# Patient Record
Sex: FEMALE | Race: OTHER | Employment: OTHER | URBAN - METROPOLITAN AREA
[De-identification: names, ages, dates, MRNs, and addresses within clinical notes are randomized per-mention and may not be internally consistent; named-entity substitution may affect disease eponyms.]

---

## 2019-04-15 PROBLEM — N18.30 CKD (CHRONIC KIDNEY DISEASE) STAGE 3, GFR 30-59 ML/MIN (HCC): Status: ACTIVE | Noted: 2019-04-15

## 2019-06-20 ENCOUNTER — HOSPITAL ENCOUNTER (OUTPATIENT)
Dept: MAMMOGRAPHY | Age: 74
Discharge: HOME | End: 2019-06-20
Attending: FAMILY MEDICINE
Payer: MEDICARE

## 2019-06-20 DIAGNOSIS — Z12.31 SCREENING MAMMOGRAM, ENCOUNTER FOR: ICD-10-CM

## 2019-06-20 DIAGNOSIS — Z13.820 SPECIAL SCREENING FOR OSTEOPOROSIS: ICD-10-CM

## 2019-06-20 PROCEDURE — 77067 SCR MAMMO BI INCL CAD: CPT

## 2019-06-20 PROCEDURE — 77080 DXA BONE DENSITY AXIAL: CPT

## 2020-06-22 ENCOUNTER — HOSPITAL ENCOUNTER (OUTPATIENT)
Dept: MAMMOGRAPHY | Age: 75
Discharge: HOME | End: 2020-06-22
Attending: FAMILY MEDICINE

## 2020-06-22 DIAGNOSIS — Z12.31 SCREENING MAMMOGRAM, ENCOUNTER FOR: ICD-10-CM

## 2021-12-27 PROBLEM — F11.99 OPIOID USE, UNSPECIFIED WITH UNSPECIFIED OPIOID-INDUCED DISORDER (HCC): Status: ACTIVE | Noted: 2021-12-27

## 2022-03-18 PROBLEM — N18.30 CKD (CHRONIC KIDNEY DISEASE) STAGE 3, GFR 30-59 ML/MIN (HCC): Status: ACTIVE | Noted: 2019-04-15

## 2022-03-18 PROBLEM — F11.99 OPIOID USE, UNSPECIFIED WITH UNSPECIFIED OPIOID-INDUCED DISORDER (HCC): Status: ACTIVE | Noted: 2021-12-27

## 2022-06-24 RX ORDER — OMEPRAZOLE 20 MG/1
CAPSULE, DELAYED RELEASE ORAL
Qty: 90 CAPSULE | Refills: 3 | Status: SHIPPED | OUTPATIENT
Start: 2022-06-24 | End: 2022-07-20 | Stop reason: SDUPTHER

## 2022-06-24 RX ORDER — TRAZODONE HYDROCHLORIDE 50 MG/1
TABLET ORAL
Qty: 90 TABLET | Refills: 3 | Status: SHIPPED | OUTPATIENT
Start: 2022-06-24 | End: 2022-07-20 | Stop reason: SDUPTHER

## 2022-06-24 RX ORDER — SERTRALINE HYDROCHLORIDE 100 MG/1
TABLET, FILM COATED ORAL
Qty: 90 TABLET | Refills: 3 | Status: SHIPPED | OUTPATIENT
Start: 2022-06-24 | End: 2022-07-20 | Stop reason: SDUPTHER

## 2022-06-24 RX ORDER — ATORVASTATIN CALCIUM 40 MG/1
TABLET, FILM COATED ORAL
Qty: 90 TABLET | Refills: 3 | Status: SHIPPED | OUTPATIENT
Start: 2022-06-24 | End: 2022-07-20 | Stop reason: SDUPTHER

## 2022-07-12 DIAGNOSIS — E55.9 VITAMIN D DEFICIENCY: ICD-10-CM

## 2022-07-12 DIAGNOSIS — Z00.00 MEDICARE ANNUAL WELLNESS VISIT, SUBSEQUENT: ICD-10-CM

## 2022-07-12 DIAGNOSIS — I10 PRIMARY HYPERTENSION: Primary | ICD-10-CM

## 2022-07-12 DIAGNOSIS — E07.9 THYROID DISEASE: ICD-10-CM

## 2022-07-12 DIAGNOSIS — E78.00 HYPERCHOLESTEROLEMIA: ICD-10-CM

## 2022-07-13 ENCOUNTER — NURSE ONLY (OUTPATIENT)
Dept: FAMILY MEDICINE CLINIC | Facility: CLINIC | Age: 77
End: 2022-07-13

## 2022-07-13 DIAGNOSIS — E07.9 THYROID DISEASE: ICD-10-CM

## 2022-07-13 DIAGNOSIS — E78.00 HYPERCHOLESTEROLEMIA: ICD-10-CM

## 2022-07-13 DIAGNOSIS — Z00.00 MEDICARE ANNUAL WELLNESS VISIT, SUBSEQUENT: ICD-10-CM

## 2022-07-13 DIAGNOSIS — E55.9 VITAMIN D DEFICIENCY: ICD-10-CM

## 2022-07-13 DIAGNOSIS — I10 PRIMARY HYPERTENSION: ICD-10-CM

## 2022-07-13 LAB
25(OH)D3 SERPL-MCNC: 31.3 NG/ML (ref 30–100)
ALBUMIN SERPL-MCNC: 3.9 G/DL (ref 3.2–4.6)
ALBUMIN/GLOB SERPL: 1 {RATIO} (ref 1.2–3.5)
ALP SERPL-CCNC: 148 U/L (ref 50–136)
ALT SERPL-CCNC: 26 U/L (ref 12–65)
ANION GAP SERPL CALC-SCNC: 3 MMOL/L (ref 7–16)
AST SERPL-CCNC: 27 U/L (ref 15–37)
BASOPHILS # BLD: 0.1 K/UL (ref 0–0.2)
BASOPHILS NFR BLD: 1 % (ref 0–2)
BILIRUB SERPL-MCNC: 0.5 MG/DL (ref 0.2–1.1)
BUN SERPL-MCNC: 31 MG/DL (ref 8–23)
CALCIUM SERPL-MCNC: 9.6 MG/DL (ref 8.3–10.4)
CHLORIDE SERPL-SCNC: 104 MMOL/L (ref 98–107)
CHOLEST SERPL-MCNC: 192 MG/DL
CO2 SERPL-SCNC: 31 MMOL/L (ref 21–32)
CREAT SERPL-MCNC: 1.4 MG/DL (ref 0.6–1)
DIFFERENTIAL METHOD BLD: NORMAL
EOSINOPHIL # BLD: 0.2 K/UL (ref 0–0.8)
EOSINOPHIL NFR BLD: 4 % (ref 0.5–7.8)
ERYTHROCYTE [DISTWIDTH] IN BLOOD BY AUTOMATED COUNT: 13.2 % (ref 11.9–14.6)
GLOBULIN SER CALC-MCNC: 3.9 G/DL (ref 2.3–3.5)
GLUCOSE SERPL-MCNC: 73 MG/DL (ref 65–100)
HCT VFR BLD AUTO: 40.2 % (ref 35.8–46.3)
HDLC SERPL-MCNC: 82 MG/DL (ref 40–60)
HDLC SERPL: 2.3 {RATIO}
HGB BLD-MCNC: 13 G/DL (ref 11.7–15.4)
IMM GRANULOCYTES # BLD AUTO: 0 K/UL (ref 0–0.5)
IMM GRANULOCYTES NFR BLD AUTO: 0 % (ref 0–5)
LDLC SERPL CALC-MCNC: 90.2 MG/DL
LYMPHOCYTES # BLD: 1.9 K/UL (ref 0.5–4.6)
LYMPHOCYTES NFR BLD: 30 % (ref 13–44)
MCH RBC QN AUTO: 30.5 PG (ref 26.1–32.9)
MCHC RBC AUTO-ENTMCNC: 32.3 G/DL (ref 31.4–35)
MCV RBC AUTO: 94.4 FL (ref 79.6–97.8)
MONOCYTES # BLD: 0.5 K/UL (ref 0.1–1.3)
MONOCYTES NFR BLD: 9 % (ref 4–12)
NEUTS SEG # BLD: 3.5 K/UL (ref 1.7–8.2)
NEUTS SEG NFR BLD: 56 % (ref 43–78)
NRBC # BLD: 0 K/UL (ref 0–0.2)
PLATELET # BLD AUTO: 237 K/UL (ref 150–450)
PMV BLD AUTO: 10.7 FL (ref 9.4–12.3)
POTASSIUM SERPL-SCNC: 4.5 MMOL/L (ref 3.5–5.1)
PROT SERPL-MCNC: 7.8 G/DL (ref 6.3–8.2)
RBC # BLD AUTO: 4.26 M/UL (ref 4.05–5.2)
SODIUM SERPL-SCNC: 138 MMOL/L (ref 136–145)
TRIGL SERPL-MCNC: 99 MG/DL (ref 35–150)
TSH, 3RD GENERATION: 0.12 UIU/ML (ref 0.36–3.74)
VLDLC SERPL CALC-MCNC: 19.8 MG/DL (ref 6–23)
WBC # BLD AUTO: 6.2 K/UL (ref 4.3–11.1)

## 2022-07-14 LAB
APPEARANCE UR: CLEAR
BACTERIA URNS QL MICRO: 0 /HPF
BILIRUB UR QL: NEGATIVE
COLOR UR: ABNORMAL
EPI CELLS #/AREA URNS HPF: ABNORMAL /HPF
GLUCOSE UR STRIP.AUTO-MCNC: NEGATIVE MG/DL
HGB UR QL STRIP: NEGATIVE
KETONES UR QL STRIP.AUTO: NEGATIVE MG/DL
LEUKOCYTE ESTERASE UR QL STRIP.AUTO: ABNORMAL
NITRITE UR QL STRIP.AUTO: NEGATIVE
OTHER OBSERVATIONS: ABNORMAL
PH UR STRIP: 6.5 [PH] (ref 5–9)
PROT UR STRIP-MCNC: NEGATIVE MG/DL
RBC #/AREA URNS HPF: ABNORMAL /HPF
SP GR UR REFRACTOMETRY: 1.02 (ref 1–1.02)
UROBILINOGEN UR QL STRIP.AUTO: 0.2 EU/DL (ref 0.2–1)
WBC URNS QL MICRO: ABNORMAL /HPF

## 2022-07-20 ENCOUNTER — OFFICE VISIT (OUTPATIENT)
Dept: FAMILY MEDICINE CLINIC | Facility: CLINIC | Age: 77
End: 2022-07-20
Payer: MEDICARE

## 2022-07-20 VITALS
BODY MASS INDEX: 31.7 KG/M2 | HEIGHT: 58 IN | WEIGHT: 151 LBS | DIASTOLIC BLOOD PRESSURE: 80 MMHG | SYSTOLIC BLOOD PRESSURE: 140 MMHG

## 2022-07-20 DIAGNOSIS — Z00.00 MEDICARE ANNUAL WELLNESS VISIT, SUBSEQUENT: Primary | ICD-10-CM

## 2022-07-20 DIAGNOSIS — Z12.31 SCREENING MAMMOGRAM FOR HIGH-RISK PATIENT: ICD-10-CM

## 2022-07-20 DIAGNOSIS — E78.00 HYPERCHOLESTEROLEMIA: ICD-10-CM

## 2022-07-20 DIAGNOSIS — Z13.31 SCREENING FOR DEPRESSION: ICD-10-CM

## 2022-07-20 DIAGNOSIS — F32.5 MAJOR DEPRESSIVE DISORDER WITH SINGLE EPISODE, IN FULL REMISSION (HCC): ICD-10-CM

## 2022-07-20 DIAGNOSIS — F41.9 ANXIETY: ICD-10-CM

## 2022-07-20 DIAGNOSIS — E07.9 THYROID DISEASE: ICD-10-CM

## 2022-07-20 DIAGNOSIS — I10 ESSENTIAL HYPERTENSION: ICD-10-CM

## 2022-07-20 DIAGNOSIS — E55.9 VITAMIN D DEFICIENCY: ICD-10-CM

## 2022-07-20 DIAGNOSIS — Z78.0 POSTMENOPAUSAL: ICD-10-CM

## 2022-07-20 DIAGNOSIS — Z12.11 SPECIAL SCREENING FOR MALIGNANT NEOPLASMS, COLON: ICD-10-CM

## 2022-07-20 DIAGNOSIS — K21.9 GERD WITHOUT ESOPHAGITIS: ICD-10-CM

## 2022-07-20 DIAGNOSIS — Z00.00 ROUTINE GENERAL MEDICAL EXAMINATION AT A HEALTH CARE FACILITY: ICD-10-CM

## 2022-07-20 DIAGNOSIS — Z13.820 SCREENING FOR OSTEOPOROSIS: ICD-10-CM

## 2022-07-20 PROBLEM — F11.99 OPIOID USE, UNSPECIFIED WITH UNSPECIFIED OPIOID-INDUCED DISORDER (HCC): Status: RESOLVED | Noted: 2021-12-27 | Resolved: 2022-07-20

## 2022-07-20 PROCEDURE — 3017F COLORECTAL CA SCREEN DOC REV: CPT | Performed by: FAMILY MEDICINE

## 2022-07-20 PROCEDURE — G0439 PPPS, SUBSEQ VISIT: HCPCS | Performed by: FAMILY MEDICINE

## 2022-07-20 PROCEDURE — 1123F ACP DISCUSS/DSCN MKR DOCD: CPT | Performed by: FAMILY MEDICINE

## 2022-07-20 RX ORDER — TRAZODONE HYDROCHLORIDE 50 MG/1
TABLET ORAL
Qty: 90 TABLET | Refills: 3 | Status: SHIPPED | OUTPATIENT
Start: 2022-07-20

## 2022-07-20 RX ORDER — AMITRIPTYLINE HYDROCHLORIDE 10 MG/1
1 TABLET, FILM COATED ORAL DAILY
COMMUNITY
Start: 2022-07-02

## 2022-07-20 RX ORDER — ATORVASTATIN CALCIUM 40 MG/1
TABLET, FILM COATED ORAL
Qty: 90 TABLET | Refills: 3 | Status: SHIPPED | OUTPATIENT
Start: 2022-07-20

## 2022-07-20 RX ORDER — ALPRAZOLAM 1 MG/1
1 TABLET ORAL 2 TIMES DAILY
Qty: 60 TABLET | Refills: 5 | Status: SHIPPED | OUTPATIENT
Start: 2022-07-20 | End: 2022-08-19

## 2022-07-20 RX ORDER — SERTRALINE HYDROCHLORIDE 100 MG/1
TABLET, FILM COATED ORAL
Qty: 90 TABLET | Refills: 3 | Status: SHIPPED | OUTPATIENT
Start: 2022-07-20

## 2022-07-20 RX ORDER — OMEPRAZOLE 20 MG/1
CAPSULE, DELAYED RELEASE ORAL
Qty: 90 CAPSULE | Refills: 3 | Status: SHIPPED | OUTPATIENT
Start: 2022-07-20

## 2022-07-20 RX ORDER — AMLODIPINE BESYLATE 5 MG/1
5 TABLET ORAL DAILY
Qty: 90 TABLET | Refills: 3 | Status: SHIPPED | OUTPATIENT
Start: 2022-07-20

## 2022-07-20 RX ORDER — LEVOTHYROXINE SODIUM 0.07 MG/1
75 TABLET ORAL DAILY
Qty: 90 TABLET | Refills: 3 | Status: SHIPPED | OUTPATIENT
Start: 2022-07-20 | End: 2022-10-06 | Stop reason: CLARIF

## 2022-07-20 ASSESSMENT — PATIENT HEALTH QUESTIONNAIRE - PHQ9
8. MOVING OR SPEAKING SO SLOWLY THAT OTHER PEOPLE COULD HAVE NOTICED. OR THE OPPOSITE, BEING SO FIGETY OR RESTLESS THAT YOU HAVE BEEN MOVING AROUND A LOT MORE THAN USUAL: 0
7. TROUBLE CONCENTRATING ON THINGS, SUCH AS READING THE NEWSPAPER OR WATCHING TELEVISION: 0
SUM OF ALL RESPONSES TO PHQ QUESTIONS 1-9: 3
5. POOR APPETITE OR OVEREATING: 0
3. TROUBLE FALLING OR STAYING ASLEEP: 1
9. THOUGHTS THAT YOU WOULD BE BETTER OFF DEAD, OR OF HURTING YOURSELF: 0
4. FEELING TIRED OR HAVING LITTLE ENERGY: 1
SUM OF ALL RESPONSES TO PHQ QUESTIONS 1-9: 3
6. FEELING BAD ABOUT YOURSELF - OR THAT YOU ARE A FAILURE OR HAVE LET YOURSELF OR YOUR FAMILY DOWN: 0
SUM OF ALL RESPONSES TO PHQ QUESTIONS 1-9: 3
SUM OF ALL RESPONSES TO PHQ QUESTIONS 1-9: 3
10. IF YOU CHECKED OFF ANY PROBLEMS, HOW DIFFICULT HAVE THESE PROBLEMS MADE IT FOR YOU TO DO YOUR WORK, TAKE CARE OF THINGS AT HOME, OR GET ALONG WITH OTHER PEOPLE: 0
2. FEELING DOWN, DEPRESSED OR HOPELESS: 1

## 2022-07-20 NOTE — PROGRESS NOTES
Medicare Annual Wellness Visit    Corinna Gifford is here for Discuss Labs, Medicare AWV (subsequent), and Leg Swelling (Chronic bilateral leg edema)    Assessment & Plan   Medicare annual wellness visit, subsequent  Vitamin D deficiency  Thyroid disease  Hypercholesterolemia  Major depressive disorder with single episode, in full remission ([address]  Anxiety  Routine general medical examination at a health care facility  -     CBC with Auto Differential; Future  -     Comprehensive Metabolic Panel; Future  -     Lipid Panel; Future  -     Vitamin D 25 Hydroxy; Future  -     TSH; Future  -     Hepatitis C Antibody; Future  -     AMB POC URINALYSIS DIP STICK AUTO W/O MICRO; Future  -     [facility] - Colonoscopy  Screening for depression  Special screening for malignant neoplasms, colon  -     [facility] - Colonoscopy  Screening mammogram for high-risk patient  -     MAYANK SCREENING W CAD BILATERAL 2 VW; Future    Recommendations for Preventive Services Due: see orders and patient instructions/AVS.  Recommended screening schedule for the next 5-10 years is provided to the patient in written form: see Patient Instructions/AVS.     Return for Medicare Annual Wellness Visit in 1 year. Subjective   Presents office today for complete physical/Medicare wellness visit without complaints    Patient's complete Health Risk Assessment and screening values have been reviewed and are found in Flowsheets. The following problems were reviewed today and where indicated follow up appointments were made and/or referrals ordered.     Positive Risk Factor Screenings with Interventions:             General Health and ACP:       Advance Directives       Power of  Living Will ACP-Advance Directive ACP-Power of     Not on File Not on File Not on File Not on File          General Health Risk Interventions:  No general health risk interventions needed at this time    Health Habits/Nutrition:  Physical Activity: Not on file        Body mass index: (!) 31.56     Health Habits/Nutrition Interventions:  No health nutrition interventions needed other than a well-balanced diet low-cholesterol low-carb exercise and weight loss recommended             Objective   Vitals:    07/20/22 1402   BP: (!) 140/80   Site: Left Upper Arm   Position: Sitting   Cuff Size: Small Adult   Weight: 151 lb (68.5 kg)   Height: 4' 10\" (1.473 m)      Body mass index is 31.56 kg/m². General Appearance: alert and oriented to person, place and time, well developed and well- nourished, in no acute distress  Skin: warm and dry, no rash or erythema  Head: normocephalic and atraumatic  Eyes: pupils equal, round, and reactive to light, extraocular eye movements intact, conjunctivae normal  ENT: tympanic membrane, external ear and ear canal normal bilaterally, nose without deformity, nasal mucosa and turbinates normal without polyps  Neck: supple and non-tender without mass, no thyromegaly or thyroid nodules, no cervical lymphadenopathy  Pulmonary/Chest: clear to auscultation bilaterally- no wheezes, rales or rhonchi, normal air movement, no respiratory distress  Cardiovascular: normal rate, regular rhythm, normal S1 and S2, no murmurs, rubs, clicks, or gallops, distal pulses intact, no carotid bruits  Abdomen: soft, non-tender, non-distended, normal bowel sounds, no masses or organomegaly  Extremities: no cyanosis, clubbing or edema  Musculoskeletal: normal range of motion, no joint swelling, deformity or tenderness  Neurologic: reflexes normal and symmetric, no cranial nerve deficit, gait, coordination and speech normal       No Known Allergies  Prior to Visit Medications    Medication Sig Taking? Authorizing Provider   amitriptyline (ELAVIL) 10 MG tablet Take 1 tablet by mouth in the morning.  Yes Historical Provider, MD   atorvastatin (LIPITOR) 40 MG tablet TAKE ONE TABLET BY MOUTH ONE TIME DAILY Yes Asad Galeas, DO   traZODone (DESYREL) 50 MG tablet TAKE ONE TABLET BY MOUTH EVERY NIGHT Yes Asad Galeas DO   sertraline (ZOLOFT) 100 MG tablet TAKE ONE TABLET BY MOUTH ONE TIME DAILY Yes Asad Galeas DO   omeprazole (PRILOSEC) 20 MG delayed release capsule TAKE ONE CAPSULE BY MOUTH ONE TIME DAILY Yes Ericka Galeas DO   ALPRAZolam (XANAX) 1 MG tablet Take 1 mg by mouth 2 times daily.  Yes Ar Automatic Reconciliation   amLODIPine (NORVASC) 5 MG tablet TAKE ONE TABLET BY MOUTH ONE TIME DAILY Yes Ar Automatic Reconciliation   fluticasone (FLONASE) 50 MCG/ACT nasal spray 2 sprays by Nasal route daily Yes Ar Automatic Reconciliation   gabapentin (NEURONTIN) 300 MG capsule TAKE ONE CAPSULE BY MOUTH THREE TIMES A DAY Yes Ar Automatic Reconciliation   levothyroxine (SYNTHROID) 75 MCG tablet TAKE ONE TABLET BY MOUTH ONE TIME DAILY BEFORE BREAKFAST Yes Ar Automatic Reconciliation       CareTeam (Including outside providers/suppliers regularly involved in providing care):   Patient Care Team:  Mariano Gonzalez DO as PCP - [address], DO as PCP - [facility] Empaneled Provider     Reviewed and updated this visit:  Allergies

## 2022-07-20 NOTE — PATIENT INSTRUCTIONS
Personalized Preventive Plan for [facility] - 7/20/2022  Medicare offers a range of preventive health benefits. Some of the tests and screenings are paid in full while other may be subject to a deductible, co-insurance, and/or copay. Some of these benefits include a comprehensive review of your medical history including lifestyle, illnesses that may run in your family, and various assessments and screenings as appropriate. After reviewing your medical record and screening and assessments performed today your provider may have ordered immunizations, labs, imaging, and/or referrals for you. A list of these orders (if applicable) as well as your Preventive Care list are included within your After Visit Summary for your review. Other Preventive Recommendations:    A preventive eye exam performed by an eye specialist is recommended every 1-2 years to screen for glaucoma; cataracts, macular degeneration, and other eye disorders. A preventive dental visit is recommended every 6 months. Try to get at least 150 minutes of exercise per week or 10,000 steps per day on a pedometer . Order or download the FREE \"Exercise & Physical Activity: Your Everyday Guide\" from [employer]. Call 3-103.343.7535 or search [employer] online. You need 9460-5818 mg of calcium and 7143-1965 IU of vitamin D per day. It is possible to meet your calcium requirement with diet alone, but a vitamin D supplement is usually necessary to meet this goal.  When exposed to the sun, use a sunscreen that protects against both UVA and UVB radiation with an SPF of 30 or greater. Reapply every 2 to 3 hours or after sweating, drying off with a towel, or swimming. Always wear a seat belt when traveling in a car. Always wear a helmet when riding a bicycle or motorcycle.

## 2022-07-25 DIAGNOSIS — I10 ESSENTIAL HYPERTENSION: ICD-10-CM

## 2022-07-26 RX ORDER — AMLODIPINE BESYLATE 5 MG/1
TABLET ORAL
Qty: 90 TABLET | Refills: 3 | OUTPATIENT
Start: 2022-07-26

## 2022-08-11 ENCOUNTER — NURSE ONLY (OUTPATIENT)
Dept: FAMILY MEDICINE CLINIC | Facility: CLINIC | Age: 77
End: 2022-08-11

## 2022-08-11 DIAGNOSIS — E07.9 THYROID DISEASE: Primary | ICD-10-CM

## 2022-08-11 LAB — TSH, 3RD GENERATION: 0.13 UIU/ML (ref 0.36–3.74)

## 2022-08-18 ENCOUNTER — TELEMEDICINE (OUTPATIENT)
Dept: FAMILY MEDICINE CLINIC | Facility: CLINIC | Age: 77
End: 2022-08-18
Payer: MEDICARE

## 2022-08-18 DIAGNOSIS — E07.9 THYROID DISEASE: Primary | ICD-10-CM

## 2022-08-18 PROCEDURE — 99443 PR PHYS/QHP TELEPHONE EVALUATION 21-30 MIN: CPT | Performed by: FAMILY MEDICINE

## 2022-08-18 ASSESSMENT — ENCOUNTER SYMPTOMS
SHORTNESS OF BREATH: 0
VOMITING: 0
NAUSEA: 0

## 2022-08-18 NOTE — PROGRESS NOTES
PROGRESS NOTE  This visit was conducted via the phone with patient's consent to the visit and all associated charges to reduce the patient's risk of exposure to COVID-19 and continuity of care for an established patient. She and/or her healthcare decision maker is aware that this patient-initiated phone encounter is a billable service, with coverage as determined by her insurance carrier. She is aware that she may receive a bill and has provided verbal consent to proceed: Yes    Vitals and physical exam deferred due to telephone visit. Total Time: minutes: 11-20 minutes. SUBJECTIVE:   Ellen Gruber is a 78 y.o. female seen for a follow up visit regarding the following chief complaint:           HPI. TSH came back showing her TSH still too low presently on 75 mcg      Past Medical History, Past Surgical History, Family history, Social History, and Medications were all reviewed with the patient today and updated as necessary. Current Outpatient Medications   Medication Sig Dispense Refill    amitriptyline (ELAVIL) 10 MG tablet Take 1 tablet by mouth in the morning. sertraline (ZOLOFT) 100 MG tablet TAKE ONE TABLET BY MOUTH ONE TIME DAILY 90 tablet 3    atorvastatin (LIPITOR) 40 MG tablet TAKE ONE TABLET BY MOUTH ONE TIME DAILY 90 tablet 3    traZODone (DESYREL) 50 MG tablet TAKE ONE TABLET BY MOUTH EVERY NIGHT 90 tablet 3    omeprazole (PRILOSEC) 20 MG delayed release capsule TAKE ONE CAPSULE BY MOUTH ONE TIME DAILY 90 capsule 3    ALPRAZolam (XANAX) 1 MG tablet Take 1 tablet by mouth in the morning and 1 tablet before bedtime. Do all this for 30 days. 60 tablet 5    levothyroxine (SYNTHROID) 75 MCG tablet Take 1 tablet by mouth in the morning. TAKE ONE TABLET BY MOUTH ONE TIME DAILY BEFORE BREAKFAST. 90 tablet 3    amLODIPine (NORVASC) 5 MG tablet Take 1 tablet by mouth in the morning. TAKE ONE TABLET BY MOUTH ONE TIME DAILY.  90 tablet 3    fluticasone (FLONASE) 50 MCG/ACT nasal spray 2 sprays by Nasal route daily      gabapentin (NEURONTIN) 300 MG capsule TAKE ONE CAPSULE BY MOUTH THREE TIMES A DAY       No current facility-administered medications for this visit. No Known Allergies  Patient Active Problem List   Diagnosis    CKD (chronic kidney disease) stage 3, GFR 30-59 ml/min ([facility])    Depression    Hypertension    Thyroid disease    Hypercholesterolemia    Anxiety     Past Medical History:   Diagnosis Date    Anxiety     Depression     Hypercholesterolemia     Hypertension     Thyroid disease      No past surgical history on file. Family History   Problem Relation Age of Onset    Breast Cancer Neg Hx      Social History     Tobacco Use    Smoking status: Never     Passive exposure: Past    Smokeless tobacco: Never   Substance Use Topics    Alcohol use: No         Review of Systems   Constitutional:  Negative for fatigue and fever. Respiratory:  Negative for shortness of breath. Cardiovascular:  Negative for chest pain. Gastrointestinal:  Negative for nausea and vomiting. OBJECTIVE:  There were no vitals taken for this visit. Physical Exam     Medical problems and test results were reviewed with the patient today. Recent Results (from the past 672 hour(s))   TSH    Collection Time: 08/11/22 10:09 AM   Result Value Ref Range    TSH, 3RD GENERATION 0.129 (L) 0.358 - 3.740 uIU/mL       ASSESSMENT and PLAN    Visit Diagnoses and Associated Orders       Thyroid disease    -  Primary    TSH [[id number] Custom]   - Future Order                     Diagnosis Orders   1. Thyroid disease  TSH      , Diagnoses and all orders for this visit:    Thyroid disease  -     TSH;  Future  ,we will start her on half a tablet a day and in 6 weeks recheck a TSH

## 2022-09-29 ENCOUNTER — NURSE ONLY (OUTPATIENT)
Dept: FAMILY MEDICINE CLINIC | Facility: CLINIC | Age: 77
End: 2022-09-29
Payer: MEDICARE

## 2022-09-29 DIAGNOSIS — E07.9 THYROID DISEASE: ICD-10-CM

## 2022-09-29 LAB — TSH, 3RD GENERATION: 6.76 UIU/ML (ref 0.36–3.74)

## 2022-09-29 PROCEDURE — 36415 COLL VENOUS BLD VENIPUNCTURE: CPT | Performed by: FAMILY MEDICINE

## 2022-10-06 ENCOUNTER — TELEMEDICINE (OUTPATIENT)
Dept: FAMILY MEDICINE CLINIC | Facility: CLINIC | Age: 77
End: 2022-10-06
Payer: MEDICARE

## 2022-10-06 DIAGNOSIS — E07.9 THYROID DISEASE: Primary | ICD-10-CM

## 2022-10-06 DIAGNOSIS — B02.29 OTHER POSTHERPETIC NERVOUS SYSTEM INVOLVEMENT: ICD-10-CM

## 2022-10-06 PROCEDURE — 99442 PR PHYS/QHP TELEPHONE EVALUATION 11-20 MIN: CPT | Performed by: FAMILY MEDICINE

## 2022-10-06 RX ORDER — LEVOTHYROXINE SODIUM 0.1 MG/1
100 TABLET ORAL DAILY
Qty: 30 TABLET | Refills: 3 | Status: SHIPPED | OUTPATIENT
Start: 2022-10-06

## 2022-10-06 SDOH — ECONOMIC STABILITY: FOOD INSECURITY: WITHIN THE PAST 12 MONTHS, YOU WORRIED THAT YOUR FOOD WOULD RUN OUT BEFORE YOU GOT MONEY TO BUY MORE.: NEVER TRUE

## 2022-10-06 SDOH — ECONOMIC STABILITY: FOOD INSECURITY: WITHIN THE PAST 12 MONTHS, THE FOOD YOU BOUGHT JUST DIDN'T LAST AND YOU DIDN'T HAVE MONEY TO GET MORE.: NEVER TRUE

## 2022-10-06 ASSESSMENT — ENCOUNTER SYMPTOMS
VOMITING: 0
SHORTNESS OF BREATH: 0
NAUSEA: 0

## 2022-10-06 ASSESSMENT — PATIENT HEALTH QUESTIONNAIRE - PHQ9
6. FEELING BAD ABOUT YOURSELF - OR THAT YOU ARE A FAILURE OR HAVE LET YOURSELF OR YOUR FAMILY DOWN: 0
SUM OF ALL RESPONSES TO PHQ QUESTIONS 1-9: 0
2. FEELING DOWN, DEPRESSED OR HOPELESS: 0
SUM OF ALL RESPONSES TO PHQ QUESTIONS 1-9: 0
2. FEELING DOWN, DEPRESSED OR HOPELESS: 0
10. IF YOU CHECKED OFF ANY PROBLEMS, HOW DIFFICULT HAVE THESE PROBLEMS MADE IT FOR YOU TO DO YOUR WORK, TAKE CARE OF THINGS AT HOME, OR GET ALONG WITH OTHER PEOPLE: 0
SUM OF ALL RESPONSES TO PHQ9 QUESTIONS 1 & 2: 0
9. THOUGHTS THAT YOU WOULD BE BETTER OFF DEAD, OR OF HURTING YOURSELF: 0
SUM OF ALL RESPONSES TO PHQ QUESTIONS 1-9: 0
3. TROUBLE FALLING OR STAYING ASLEEP: 0
8. MOVING OR SPEAKING SO SLOWLY THAT OTHER PEOPLE COULD HAVE NOTICED. OR THE OPPOSITE, BEING SO FIGETY OR RESTLESS THAT YOU HAVE BEEN MOVING AROUND A LOT MORE THAN USUAL: 0
5. POOR APPETITE OR OVEREATING: 0
7. TROUBLE CONCENTRATING ON THINGS, SUCH AS READING THE NEWSPAPER OR WATCHING TELEVISION: 0
3. TROUBLE FALLING OR STAYING ASLEEP: 0
7. TROUBLE CONCENTRATING ON THINGS, SUCH AS READING THE NEWSPAPER OR WATCHING TELEVISION: 0
SUM OF ALL RESPONSES TO PHQ QUESTIONS 1-9: 0
8. MOVING OR SPEAKING SO SLOWLY THAT OTHER PEOPLE COULD HAVE NOTICED. OR THE OPPOSITE, BEING SO FIGETY OR RESTLESS THAT YOU HAVE BEEN MOVING AROUND A LOT MORE THAN USUAL: 0
10. IF YOU CHECKED OFF ANY PROBLEMS, HOW DIFFICULT HAVE THESE PROBLEMS MADE IT FOR YOU TO DO YOUR WORK, TAKE CARE OF THINGS AT HOME, OR GET ALONG WITH OTHER PEOPLE: 0
5. POOR APPETITE OR OVEREATING: 0
SUM OF ALL RESPONSES TO PHQ QUESTIONS 1-9: 0
SUM OF ALL RESPONSES TO PHQ QUESTIONS 1-9: 0
1. LITTLE INTEREST OR PLEASURE IN DOING THINGS: 0
4. FEELING TIRED OR HAVING LITTLE ENERGY: 0
9. THOUGHTS THAT YOU WOULD BE BETTER OFF DEAD, OR OF HURTING YOURSELF: 0
SUM OF ALL RESPONSES TO PHQ QUESTIONS 1-9: 0
4. FEELING TIRED OR HAVING LITTLE ENERGY: 0
SUM OF ALL RESPONSES TO PHQ QUESTIONS 1-9: 0
6. FEELING BAD ABOUT YOURSELF - OR THAT YOU ARE A FAILURE OR HAVE LET YOURSELF OR YOUR FAMILY DOWN: 0

## 2022-10-06 ASSESSMENT — SOCIAL DETERMINANTS OF HEALTH (SDOH): HOW HARD IS IT FOR YOU TO PAY FOR THE VERY BASICS LIKE FOOD, HOUSING, MEDICAL CARE, AND HEATING?: NOT HARD AT ALL

## 2022-11-08 ENCOUNTER — HOSPITAL ENCOUNTER (OUTPATIENT)
Dept: GENERAL RADIOLOGY | Age: 77
Discharge: HOME | End: 2022-11-11

## 2022-11-08 DIAGNOSIS — M54.14 THORACIC RADICULOPATHY: ICD-10-CM

## 2022-11-15 ENCOUNTER — OFFICE VISIT (OUTPATIENT)
Dept: FAMILY MEDICINE CLINIC | Facility: CLINIC | Age: 77
End: 2022-11-15
Payer: MEDICARE

## 2022-11-15 VITALS
WEIGHT: 152 LBS | SYSTOLIC BLOOD PRESSURE: 120 MMHG | HEIGHT: 58 IN | DIASTOLIC BLOOD PRESSURE: 80 MMHG | BODY MASS INDEX: 31.91 KG/M2

## 2022-11-15 DIAGNOSIS — F41.9 ANXIETY: ICD-10-CM

## 2022-11-15 DIAGNOSIS — N63.13 MASS OF LOWER OUTER QUADRANT OF RIGHT BREAST: Primary | ICD-10-CM

## 2022-11-15 DIAGNOSIS — N61.0 MASTITIS IN FEMALE: ICD-10-CM

## 2022-11-15 DIAGNOSIS — B02.29 OTHER POSTHERPETIC NERVOUS SYSTEM INVOLVEMENT: ICD-10-CM

## 2022-11-15 PROCEDURE — 3078F DIAST BP <80 MM HG: CPT | Performed by: FAMILY MEDICINE

## 2022-11-15 PROCEDURE — 1090F PRES/ABSN URINE INCON ASSESS: CPT | Performed by: FAMILY MEDICINE

## 2022-11-15 PROCEDURE — 1123F ACP DISCUSS/DSCN MKR DOCD: CPT | Performed by: FAMILY MEDICINE

## 2022-11-15 PROCEDURE — 99214 OFFICE O/P EST MOD 30 MIN: CPT | Performed by: FAMILY MEDICINE

## 2022-11-15 PROCEDURE — G8484 FLU IMMUNIZE NO ADMIN: HCPCS | Performed by: FAMILY MEDICINE

## 2022-11-15 PROCEDURE — G8427 DOCREV CUR MEDS BY ELIG CLIN: HCPCS | Performed by: FAMILY MEDICINE

## 2022-11-15 PROCEDURE — 3074F SYST BP LT 130 MM HG: CPT | Performed by: FAMILY MEDICINE

## 2022-11-15 PROCEDURE — G8399 PT W/DXA RESULTS DOCUMENT: HCPCS | Performed by: FAMILY MEDICINE

## 2022-11-15 PROCEDURE — 3017F COLORECTAL CA SCREEN DOC REV: CPT | Performed by: FAMILY MEDICINE

## 2022-11-15 PROCEDURE — 1036F TOBACCO NON-USER: CPT | Performed by: FAMILY MEDICINE

## 2022-11-15 PROCEDURE — G8417 CALC BMI ABV UP PARAM F/U: HCPCS | Performed by: FAMILY MEDICINE

## 2022-11-15 RX ORDER — NAPROXEN 500 MG/1
500 TABLET ORAL 2 TIMES DAILY PRN
Qty: 60 TABLET | Refills: 0 | Status: SHIPPED | OUTPATIENT
Start: 2022-11-15

## 2022-11-15 RX ORDER — ALPRAZOLAM 1 MG/1
1 TABLET ORAL NIGHTLY PRN
Qty: 30 TABLET | Refills: 5
Start: 2022-11-15 | End: 2022-12-15

## 2022-11-15 RX ORDER — CEPHALEXIN 500 MG/1
500 CAPSULE ORAL 3 TIMES DAILY
Qty: 30 CAPSULE | Refills: 0 | Status: SHIPPED | OUTPATIENT
Start: 2022-11-15 | End: 2022-11-25

## 2022-11-15 RX ORDER — ALPRAZOLAM 1 MG/1
1 TABLET ORAL DAILY
COMMUNITY
Start: 2022-10-25 | End: 2022-11-15 | Stop reason: SDUPTHER

## 2022-11-15 ASSESSMENT — PATIENT HEALTH QUESTIONNAIRE - PHQ9
SUM OF ALL RESPONSES TO PHQ QUESTIONS 1-9: 6
8. MOVING OR SPEAKING SO SLOWLY THAT OTHER PEOPLE COULD HAVE NOTICED. OR THE OPPOSITE, BEING SO FIGETY OR RESTLESS THAT YOU HAVE BEEN MOVING AROUND A LOT MORE THAN USUAL: 0
4. FEELING TIRED OR HAVING LITTLE ENERGY: 2
7. TROUBLE CONCENTRATING ON THINGS, SUCH AS READING THE NEWSPAPER OR WATCHING TELEVISION: 0
9. THOUGHTS THAT YOU WOULD BE BETTER OFF DEAD, OR OF HURTING YOURSELF: 0
SUM OF ALL RESPONSES TO PHQ QUESTIONS 1-9: 6
SUM OF ALL RESPONSES TO PHQ QUESTIONS 1-9: 6
3. TROUBLE FALLING OR STAYING ASLEEP: 1
5. POOR APPETITE OR OVEREATING: 0
10. IF YOU CHECKED OFF ANY PROBLEMS, HOW DIFFICULT HAVE THESE PROBLEMS MADE IT FOR YOU TO DO YOUR WORK, TAKE CARE OF THINGS AT HOME, OR GET ALONG WITH OTHER PEOPLE: 1
2. FEELING DOWN, DEPRESSED OR HOPELESS: 3
6. FEELING BAD ABOUT YOURSELF - OR THAT YOU ARE A FAILURE OR HAVE LET YOURSELF OR YOUR FAMILY DOWN: 0
SUM OF ALL RESPONSES TO PHQ QUESTIONS 1-9: 6

## 2022-11-15 ASSESSMENT — ENCOUNTER SYMPTOMS
ABDOMINAL PAIN: 0
COUGH: 0
SHORTNESS OF BREATH: 0
SINUS PAIN: 0
DIARRHEA: 0
RHINORRHEA: 0

## 2022-11-15 NOTE — PROGRESS NOTES
facility-administered medications for this visit. No Known Allergies  Patient Active Problem List   Diagnosis    CKD (chronic kidney disease) stage 3, GFR 30-59 ml/min ([facility])    Depression    Hypertension    Thyroid disease    Hypercholesterolemia    Anxiety     Past Medical History:   Diagnosis Date    Anxiety     Depression     Hypercholesterolemia     Hypertension     Thyroid disease      No past surgical history on file. Family History   Problem Relation Age of Onset    Breast Cancer Neg Hx      Social History     Tobacco Use    Smoking status: Never     Passive exposure: Past    Smokeless tobacco: Never   Substance Use Topics    Alcohol use: No         Review of Systems   Constitutional:  Negative for chills, fatigue and fever. HENT:  Negative for congestion, rhinorrhea and sinus pain. Eyes:  Negative for visual disturbance. Respiratory:  Negative for cough and shortness of breath. Cardiovascular:  Positive for chest pain. Gastrointestinal:  Negative for abdominal pain and diarrhea. Endocrine:        Right sided breast mass   Genitourinary:  Negative for dysuria. Musculoskeletal:  Negative for arthralgias and myalgias. Neurological:  Negative for dizziness, weakness and headaches. Psychiatric/Behavioral: Negative. OBJECTIVE:  /80 (Site: Left Upper Arm, Position: Sitting, Cuff Size: Small Adult)   Ht 4' 10\" (1.473 m)   Wt 152 lb (68.9 kg)   BMI 31.77 kg/m²      Physical Exam  Vitals and nursing note reviewed. Constitutional:       Appearance: Normal appearance. HENT:      Head: Normocephalic and atraumatic. Right Ear: Tympanic membrane normal.      Left Ear: Tympanic membrane normal.      Nose: Nose normal.      Mouth/Throat:      Mouth: Mucous membranes are moist.   Eyes:      Conjunctiva/sclera: Conjunctivae normal.      Pupils: Pupils are equal, round, and reactive to light. Cardiovascular:      Rate and Rhythm: Normal rate and regular rhythm.       Pulses: Normal pulses. Heart sounds: Normal heart sounds. Pulmonary:      Effort: Pulmonary effort is normal.      Breath sounds: Normal breath sounds. Chest:       Abdominal:      General: Abdomen is flat. Bowel sounds are normal.      Palpations: Abdomen is soft. Musculoskeletal:         General: Normal range of motion. Arms:       Cervical back: Normal range of motion and neck supple. Comments: Sensitive to touch no skin changes noted   Skin:     General: Skin is warm and dry. Neurological:      General: No focal deficit present. Mental Status: She is alert and oriented to person, place, and time. Psychiatric:         Behavior: Behavior normal.        Medical problems and test results were reviewed with the patient today. No results found for this or any previous visit (from the past 672 hour(s)). ASSESSMENT and PLAN    Visit Diagnoses and Associated Orders       Mass of lower outer quadrant of right breast    -  Primary    MAYANK DIGITAL DIAGNOSTIC W OR WO CAD BILATERAL [[id number] Custom]   - Future Order    US BREAST COMPLETE RIGHT [[id number] Custom]   - Future Order         Mastitis in female        naproxen (NAPROSYN) 500 MG tablet [[id number]]      cephALEXin (KEFLEX) 500 MG capsule [8070]           Other postherpetic nervous system involvement             Anxiety        ALPRAZolam (XANAX) 1 MG tablet [326]                       Diagnosis Orders   1. Mass of lower outer quadrant of right breast  MAYANK DIGITAL DIAGNOSTIC W OR WO CAD BILATERAL    US BREAST COMPLETE RIGHT      2. Mastitis in female  naproxen (NAPROSYN) 500 MG tablet    cephALEXin (KEFLEX) 500 MG capsule      3. Other postherpetic nervous system involvement        4. Anxiety  ALPRAZolam (XANAX) 1 MG tablet      , Bethany Mckeon was seen today for breast mass and herpes zoster. Diagnoses and all orders for this visit:    Mass of lower outer quadrant of right breast  -     MAYANK DIGITAL DIAGNOSTIC W OR WO CAD BILATERAL;  Future  -     US BREAST COMPLETE RIGHT; Future    Mastitis in female  -     naproxen (NAPROSYN) 500 MG tablet; Take 1 tablet by mouth 2 times daily as needed for Pain  -     cephALEXin (KEFLEX) 500 MG capsule; Take 1 capsule by mouth 3 times daily for 10 days    Other postherpetic nervous system involvement    Anxiety  -     ALPRAZolam (XANAX) 1 MG tablet; Take 1 tablet by mouth nightly as needed for Sleep for up to 30 days.   , Patient will go get an ultrasound and a mammogram and we will call her back with the results and plan in the meantime gave her Naprosyn for the swelling tenderness she has a little bit of erythema start her on Keflex to cover for mastitis associated her Xanax that she takes once a day for sleep and anxiety continue follow-ups with pain management for her postherpetic neuralgia and return for follow-up or office visit to go over ultrasound and mammogram

## 2022-11-16 ENCOUNTER — HOSPITAL ENCOUNTER (OUTPATIENT)
Dept: MAMMOGRAPHY | Age: 77
Discharge: HOME | End: 2022-11-19
Payer: MEDICARE

## 2022-11-16 ENCOUNTER — APPOINTMENT (OUTPATIENT)
Dept: MAMMOGRAPHY | Age: 77
End: 2022-11-16
Payer: MEDICARE

## 2022-11-16 DIAGNOSIS — N63.13 MASS OF LOWER OUTER QUADRANT OF RIGHT BREAST: ICD-10-CM

## 2022-11-16 PROCEDURE — 76642 ULTRASOUND BREAST LIMITED: CPT

## 2022-11-16 PROCEDURE — 77066 DX MAMMO INCL CAD BI: CPT

## 2022-11-21 ENCOUNTER — APPOINTMENT (OUTPATIENT)
Dept: MAMMOGRAPHY | Age: 77
End: 2022-11-21
Payer: MEDICARE

## 2022-11-21 ENCOUNTER — HOSPITAL ENCOUNTER (OUTPATIENT)
Dept: MAMMOGRAPHY | Age: 77
Discharge: HOME | End: 2022-11-24
Payer: MEDICARE

## 2022-11-21 VITALS — SYSTOLIC BLOOD PRESSURE: 163 MMHG | HEART RATE: 68 BPM | DIASTOLIC BLOOD PRESSURE: 69 MMHG

## 2022-11-21 DIAGNOSIS — R92.8 ABNORMAL ULTRASOUND OF BREAST: ICD-10-CM

## 2022-11-21 DIAGNOSIS — R92.8 ABNORMAL FINDING ON MAMMOGRAPHY: ICD-10-CM

## 2022-11-21 PROCEDURE — 19083 BX BREAST 1ST LESION US IMAG: CPT

## 2022-11-21 PROCEDURE — 2500000003 HC RX 250 WO HCPCS: Performed by: FAMILY MEDICINE

## 2022-11-21 PROCEDURE — 77065 DX MAMMO INCL CAD UNI: CPT

## 2022-11-21 PROCEDURE — 88346 IMFLUOR 1ST 1ANTB STAIN PX: CPT

## 2022-11-21 PROCEDURE — 88305 TISSUE EXAM BY PATHOLOGIST: CPT

## 2022-11-21 RX ORDER — LIDOCAINE HYDROCHLORIDE 10 MG/ML
5 INJECTION, SOLUTION INFILTRATION; PERINEURAL ONCE
Status: COMPLETED | OUTPATIENT
Start: 2022-11-21 | End: 2022-11-21

## 2022-11-21 RX ADMIN — LIDOCAINE HYDROCHLORIDE 5 ML: 10 INJECTION, SOLUTION INFILTRATION; PERINEURAL at 08:51

## 2022-11-21 ASSESSMENT — PAIN SCALES - GENERAL: PAINLEVEL_OUTOF10: 0

## 2022-11-22 ENCOUNTER — TELEPHONE (OUTPATIENT)
Dept: MAMMOGRAPHY | Age: 77
End: 2022-11-22

## 2022-11-30 ENCOUNTER — TELEPHONE (OUTPATIENT)
Dept: MAMMOGRAPHY | Age: 77
End: 2022-11-30

## 2022-12-01 ENCOUNTER — TELEPHONE (OUTPATIENT)
Dept: MAMMOGRAPHY | Age: 77
End: 2022-12-01

## 2022-12-05 ENCOUNTER — NURSE ONLY (OUTPATIENT)
Dept: FAMILY MEDICINE CLINIC | Facility: CLINIC | Age: 77
End: 2022-12-05

## 2022-12-05 ENCOUNTER — OFFICE VISIT (OUTPATIENT)
Dept: FAMILY MEDICINE CLINIC | Facility: CLINIC | Age: 77
End: 2022-12-05
Payer: MEDICARE

## 2022-12-05 VITALS
BODY MASS INDEX: 32.12 KG/M2 | WEIGHT: 153 LBS | DIASTOLIC BLOOD PRESSURE: 90 MMHG | HEIGHT: 58 IN | SYSTOLIC BLOOD PRESSURE: 136 MMHG

## 2022-12-05 DIAGNOSIS — E07.9 THYROID DISEASE: ICD-10-CM

## 2022-12-05 DIAGNOSIS — F41.9 ANXIETY: ICD-10-CM

## 2022-12-05 DIAGNOSIS — B02.29 OTHER POSTHERPETIC NERVOUS SYSTEM INVOLVEMENT: ICD-10-CM

## 2022-12-05 DIAGNOSIS — C83.30 DIFFUSE LARGE B-CELL LYMPHOMA, UNSPECIFIED BODY REGION (HCC): Primary | ICD-10-CM

## 2022-12-05 PROCEDURE — G8427 DOCREV CUR MEDS BY ELIG CLIN: HCPCS | Performed by: FAMILY MEDICINE

## 2022-12-05 PROCEDURE — 1036F TOBACCO NON-USER: CPT | Performed by: FAMILY MEDICINE

## 2022-12-05 PROCEDURE — G8484 FLU IMMUNIZE NO ADMIN: HCPCS | Performed by: FAMILY MEDICINE

## 2022-12-05 PROCEDURE — G8417 CALC BMI ABV UP PARAM F/U: HCPCS | Performed by: FAMILY MEDICINE

## 2022-12-05 PROCEDURE — 3078F DIAST BP <80 MM HG: CPT | Performed by: FAMILY MEDICINE

## 2022-12-05 PROCEDURE — 1123F ACP DISCUSS/DSCN MKR DOCD: CPT | Performed by: FAMILY MEDICINE

## 2022-12-05 PROCEDURE — G8399 PT W/DXA RESULTS DOCUMENT: HCPCS | Performed by: FAMILY MEDICINE

## 2022-12-05 PROCEDURE — 1090F PRES/ABSN URINE INCON ASSESS: CPT | Performed by: FAMILY MEDICINE

## 2022-12-05 PROCEDURE — 99215 OFFICE O/P EST HI 40 MIN: CPT | Performed by: FAMILY MEDICINE

## 2022-12-05 PROCEDURE — 3017F COLORECTAL CA SCREEN DOC REV: CPT | Performed by: FAMILY MEDICINE

## 2022-12-05 PROCEDURE — 3074F SYST BP LT 130 MM HG: CPT | Performed by: FAMILY MEDICINE

## 2022-12-05 RX ORDER — HYDROCODONE BITARTRATE AND ACETAMINOPHEN 5; 325 MG/1; MG/1
1 TABLET ORAL EVERY 8 HOURS PRN
Qty: 30 TABLET | Refills: 0 | Status: SHIPPED | OUTPATIENT
Start: 2022-12-05 | End: 2023-01-04

## 2022-12-05 ASSESSMENT — ENCOUNTER SYMPTOMS
SINUS PAIN: 0
RHINORRHEA: 0
SHORTNESS OF BREATH: 0
DIARRHEA: 0
COUGH: 0
ABDOMINAL PAIN: 0

## 2022-12-05 ASSESSMENT — PATIENT HEALTH QUESTIONNAIRE - PHQ9
10. IF YOU CHECKED OFF ANY PROBLEMS, HOW DIFFICULT HAVE THESE PROBLEMS MADE IT FOR YOU TO DO YOUR WORK, TAKE CARE OF THINGS AT HOME, OR GET ALONG WITH OTHER PEOPLE: 0
1. LITTLE INTEREST OR PLEASURE IN DOING THINGS: 0
2. FEELING DOWN, DEPRESSED OR HOPELESS: 0
SUM OF ALL RESPONSES TO PHQ9 QUESTIONS 1 & 2: 0
SUM OF ALL RESPONSES TO PHQ QUESTIONS 1-9: 3
6. FEELING BAD ABOUT YOURSELF - OR THAT YOU ARE A FAILURE OR HAVE LET YOURSELF OR YOUR FAMILY DOWN: 0
SUM OF ALL RESPONSES TO PHQ QUESTIONS 1-9: 3
7. TROUBLE CONCENTRATING ON THINGS, SUCH AS READING THE NEWSPAPER OR WATCHING TELEVISION: 0
8. MOVING OR SPEAKING SO SLOWLY THAT OTHER PEOPLE COULD HAVE NOTICED. OR THE OPPOSITE, BEING SO FIGETY OR RESTLESS THAT YOU HAVE BEEN MOVING AROUND A LOT MORE THAN USUAL: 0
9. THOUGHTS THAT YOU WOULD BE BETTER OFF DEAD, OR OF HURTING YOURSELF: 0
3. TROUBLE FALLING OR STAYING ASLEEP: 1
5. POOR APPETITE OR OVEREATING: 0
4. FEELING TIRED OR HAVING LITTLE ENERGY: 2
SUM OF ALL RESPONSES TO PHQ QUESTIONS 1-9: 3
SUM OF ALL RESPONSES TO PHQ QUESTIONS 1-9: 3

## 2022-12-05 NOTE — PROGRESS NOTES
PROGRESS NOTE    SUBJECTIVE:   Chichi Branham is a 78 y.o. female seen for a follow up visit regarding the following chief complaint:     Chief Complaint   Patient presents with    Other     Bx of breast results           HPI patient presents to the office with family members confused and upset because apparently had a mammogram for mass in her breast had a biopsy done and referral to oncology which was changed and they have not heard back and they are very anxious wanted to go see another oncologist at [facility] also complaining of postherpetic neuralgia and seeing pain management states that the shots did not work and does not know of any follow-up plan with pain management? No notes available in epic? Past Medical History, Past Surgical History, Family history, Social History, and Medications were all reviewed with the patient today and updated as necessary. Current Outpatient Medications   Medication Sig Dispense Refill    HYDROcodone-acetaminophen (NORCO) 5-325 MG per tablet Take 1 tablet by mouth every 8 hours as needed for Pain for up to 30 days. 30 tablet 0    naproxen (NAPROSYN) 500 MG tablet Take 1 tablet by mouth 2 times daily as needed for Pain 60 tablet 0    ALPRAZolam (XANAX) 1 MG tablet Take 1 tablet by mouth nightly as needed for Sleep for up to 30 days. 30 tablet 5    levothyroxine (SYNTHROID) 100 MCG tablet Take 1 tablet by mouth Daily 30 tablet 3    amitriptyline (ELAVIL) 10 MG tablet Take 1 tablet by mouth in the morning. sertraline (ZOLOFT) 100 MG tablet TAKE ONE TABLET BY MOUTH ONE TIME DAILY 90 tablet 3    atorvastatin (LIPITOR) 40 MG tablet TAKE ONE TABLET BY MOUTH ONE TIME DAILY 90 tablet 3    traZODone (DESYREL) 50 MG tablet TAKE ONE TABLET BY MOUTH EVERY NIGHT 90 tablet 3    omeprazole (PRILOSEC) 20 MG delayed release capsule TAKE ONE CAPSULE BY MOUTH ONE TIME DAILY 90 capsule 3    amLODIPine (NORVASC) 5 MG tablet Take 1 tablet by mouth in the morning.  TAKE ONE membrane normal.      Left Ear: Tympanic membrane normal.      Nose: Nose normal.      Mouth/Throat:      Mouth: Mucous membranes are moist.   Eyes:      Conjunctiva/sclera: Conjunctivae normal.      Pupils: Pupils are equal, round, and reactive to light. Cardiovascular:      Rate and Rhythm: Normal rate and regular rhythm. Pulses: Normal pulses. Heart sounds: Normal heart sounds. Pulmonary:      Effort: Pulmonary effort is normal.      Breath sounds: Normal breath sounds. Abdominal:      General: Abdomen is flat. Bowel sounds are normal.      Palpations: Abdomen is soft. Musculoskeletal:         General: Normal range of motion. Cervical back: Normal range of motion and neck supple. Skin:     General: Skin is warm and dry. Neurological:      General: No focal deficit present. Mental Status: She is alert and oriented to person, place, and time. Psychiatric:         Behavior: Behavior normal.        Medical problems and test results were reviewed with the patient today. No results found for this or any previous visit (from the past 672 hour(s)). ASSESSMENT and PLAN    Visit Diagnoses and Associated Orders       Diffuse large B-cell lymphoma, unspecified body region [facility])    -  Primary    External Referral To Oncology [KBP718 Custom]      [address] [place] [address] Hematology & Oncology [RHO96 Custom]           Anxiety             Other postherpetic nervous system involvement        HYDROcodone-acetaminophen (NORCO) 5-325 MG per tablet [[id number]]                       Diagnosis Orders   1. Diffuse large B-cell lymphoma, unspecified body region [facility])  External Referral To Oncology    [address] [place] [address] Hematology & Oncology      2. Anxiety        3. Other postherpetic nervous system involvement  HYDROcodone-acetaminophen (NORCO) 5-325 MG per tablet      , Félix Kelly was seen today for other.     Diagnoses and all orders for this visit:    Diffuse large B-cell lymphoma, unspecified body region [facility]  -     External Referral To Oncology  -     [address] Hematology & Oncology    Anxiety    Other postherpetic nervous system involvement  -     HYDROcodone-acetaminophen (NORCO) 5-325 MG per tablet; Take 1 tablet by mouth every 8 hours as needed for Pain for up to 30 days. , After long discussion with daughter about all this confusion will refer patient to oncology doctor at [facility] keep her oncology appointment because the [facility] might take weeks to months and apparently they are working on the [facility] oncology appointment which I will send in again as well and if not get a second opinion recommended pain medicine and to follow-up with pain management and recommended she continue on her anxiety medication and Zoloft until we get all this information back which will help with her nerves. More than 50% of this 40 minute visit was spent counseling the patient about multiple complaints.

## 2022-12-06 LAB — TSH, 3RD GENERATION: 0.01 UIU/ML (ref 0.36–3.74)

## 2022-12-08 ENCOUNTER — TELEMEDICINE (OUTPATIENT)
Dept: FAMILY MEDICINE CLINIC | Facility: CLINIC | Age: 77
End: 2022-12-08
Payer: MEDICARE

## 2022-12-08 DIAGNOSIS — E07.9 THYROID DISEASE: Primary | ICD-10-CM

## 2022-12-08 PROCEDURE — 99442 PR PHYS/QHP TELEPHONE EVALUATION 11-20 MIN: CPT | Performed by: FAMILY MEDICINE

## 2022-12-08 RX ORDER — LEVOTHYROXINE SODIUM 88 UG/1
88 TABLET ORAL DAILY
Qty: 30 TABLET | Refills: 5 | Status: SHIPPED | OUTPATIENT
Start: 2022-12-08

## 2022-12-08 ASSESSMENT — PATIENT HEALTH QUESTIONNAIRE - PHQ9
3. TROUBLE FALLING OR STAYING ASLEEP: 1
7. TROUBLE CONCENTRATING ON THINGS, SUCH AS READING THE NEWSPAPER OR WATCHING TELEVISION: 0
9. THOUGHTS THAT YOU WOULD BE BETTER OFF DEAD, OR OF HURTING YOURSELF: 0
1. LITTLE INTEREST OR PLEASURE IN DOING THINGS: 0
6. FEELING BAD ABOUT YOURSELF - OR THAT YOU ARE A FAILURE OR HAVE LET YOURSELF OR YOUR FAMILY DOWN: 0
8. MOVING OR SPEAKING SO SLOWLY THAT OTHER PEOPLE COULD HAVE NOTICED. OR THE OPPOSITE, BEING SO FIGETY OR RESTLESS THAT YOU HAVE BEEN MOVING AROUND A LOT MORE THAN USUAL: 0
SUM OF ALL RESPONSES TO PHQ QUESTIONS 1-9: 2
4. FEELING TIRED OR HAVING LITTLE ENERGY: 1
SUM OF ALL RESPONSES TO PHQ9 QUESTIONS 1 & 2: 0
5. POOR APPETITE OR OVEREATING: 0
SUM OF ALL RESPONSES TO PHQ QUESTIONS 1-9: 2
2. FEELING DOWN, DEPRESSED OR HOPELESS: 0
10. IF YOU CHECKED OFF ANY PROBLEMS, HOW DIFFICULT HAVE THESE PROBLEMS MADE IT FOR YOU TO DO YOUR WORK, TAKE CARE OF THINGS AT HOME, OR GET ALONG WITH OTHER PEOPLE: 1
SUM OF ALL RESPONSES TO PHQ QUESTIONS 1-9: 2
SUM OF ALL RESPONSES TO PHQ QUESTIONS 1-9: 2

## 2022-12-08 ASSESSMENT — ENCOUNTER SYMPTOMS
NAUSEA: 0
VOMITING: 0
SHORTNESS OF BREATH: 0

## 2022-12-08 NOTE — PROGRESS NOTES
PROGRESS NOTE  This visit was conducted via the phone with patient's consent to the visit and all associated charges to reduce the patient's risk of exposure to COVID-19 and continuity of care for an established patient. She and/or her healthcare decision maker is aware that this patient-initiated phone encounter is a billable service, with coverage as determined by her insurance carrier. She is aware that she may receive a bill and has provided verbal consent to proceed: Yes    Vitals and physical exam deferred due to telephone visit. Total Time: minutes: 11-20 minutes. SUBJECTIVE:   Mark Medina is a 78 y.o. female seen for a follow up visit regarding the following chief complaint:     Chief Complaint   Patient presents with    Discuss Labs           HPI  Patient is doing a phone call visit to go over her TSH without complaints    Past Medical History, Past Surgical History, Family history, Social History, and Medications were all reviewed with the patient today and updated as necessary. Current Outpatient Medications   Medication Sig Dispense Refill    levothyroxine (SYNTHROID) 88 MCG tablet Take 1 tablet by mouth daily 30 tablet 5    HYDROcodone-acetaminophen (NORCO) 5-325 MG per tablet Take 1 tablet by mouth every 8 hours as needed for Pain for up to 30 days. 30 tablet 0    ALPRAZolam (XANAX) 1 MG tablet Take 1 tablet by mouth nightly as needed for Sleep for up to 30 days. 30 tablet 5    amitriptyline (ELAVIL) 10 MG tablet Take 1 tablet by mouth in the morning.       sertraline (ZOLOFT) 100 MG tablet TAKE ONE TABLET BY MOUTH ONE TIME DAILY 90 tablet 3    atorvastatin (LIPITOR) 40 MG tablet TAKE ONE TABLET BY MOUTH ONE TIME DAILY 90 tablet 3    traZODone (DESYREL) 50 MG tablet TAKE ONE TABLET BY MOUTH EVERY NIGHT 90 tablet 3    omeprazole (PRILOSEC) 20 MG delayed release capsule TAKE ONE CAPSULE BY MOUTH ONE TIME DAILY 90 capsule 3    amLODIPine (NORVASC) 5 MG tablet Take 1 tablet by mouth in the morning. TAKE ONE TABLET BY MOUTH ONE TIME DAILY. 90 tablet 3    fluticasone (FLONASE) 50 MCG/ACT nasal spray 2 sprays by Nasal route daily      gabapentin (NEURONTIN) 300 MG capsule TAKE ONE CAPSULE BY MOUTH THREE TIMES A DAY      naproxen (NAPROSYN) 500 MG tablet Take 1 tablet by mouth 2 times daily as needed for Pain 60 tablet 0     No current facility-administered medications for this visit. No Known Allergies  Patient Active Problem List   Diagnosis    CKD (chronic kidney disease) stage 3, GFR 30-59 ml/min ([facility])    Depression    Hypertension    Thyroid disease    Hypercholesterolemia    Anxiety     Past Medical History:   Diagnosis Date    Anxiety     Depression     Hypercholesterolemia     Hypertension     Thyroid disease      Past Surgical History:   Procedure Laterality Date    US BREAST NEEDLE BIOPSY RIGHT Right 11/21/2022    US BREAST NEEDLE BIOPSY RIGHT 11/21/2022 Chitra Okeefe MD [facility]     Family History   Problem Relation Age of Onset    Breast Cancer Neg Hx      Social History     Tobacco Use    Smoking status: Never     Passive exposure: Past    Smokeless tobacco: Never   Substance Use Topics    Alcohol use: No         Review of Systems   Constitutional:  Negative for fatigue and fever. Respiratory:  Negative for shortness of breath. Cardiovascular:  Negative for chest pain. Gastrointestinal:  Negative for nausea and vomiting. OBJECTIVE:  There were no vitals taken for this visit. Physical Exam     Medical problems and test results were reviewed with the patient today. Recent Results (from the past 672 hour(s))   TSH    Collection Time: 12/05/22  4:56 PM   Result Value Ref Range    TSH, 3RD GENERATION 0.015 (L) 0.358 - 3.740 uIU/mL       ASSESSMENT and PLAN    Visit Diagnoses and Associated Orders       Thyroid disease    -  Primary    levothyroxine (SYNTHROID) 88 MCG tablet [72044]                       Diagnosis Orders   1.  Thyroid disease levothyroxine (SYNTHROID) 88 MCG tablet      , Inocencio Vides was seen today for discuss labs. Diagnoses and all orders for this visit:    Thyroid disease  -     levothyroxine (SYNTHROID) 88 MCG tablet;  Take 1 tablet by mouth daily    , Dipesh patient's Synthroid to 88 and recheck a TSH in 6 weeks otherwise supportive care given

## 2023-01-20 ENCOUNTER — NURSE ONLY (OUTPATIENT)
Dept: FAMILY MEDICINE CLINIC | Facility: CLINIC | Age: 78
End: 2023-01-20
Payer: MEDICARE

## 2023-01-20 DIAGNOSIS — E07.9 THYROID DISEASE: Primary | ICD-10-CM

## 2023-01-20 PROCEDURE — 36415 COLL VENOUS BLD VENIPUNCTURE: CPT | Performed by: FAMILY MEDICINE

## 2023-01-21 LAB — TSH, 3RD GENERATION: 0.05 UIU/ML (ref 0.36–3.74)

## 2023-01-27 ENCOUNTER — TELEMEDICINE (OUTPATIENT)
Dept: FAMILY MEDICINE CLINIC | Facility: CLINIC | Age: 78
End: 2023-01-27
Payer: MEDICARE

## 2023-01-27 DIAGNOSIS — N18.30 STAGE 3 CHRONIC KIDNEY DISEASE, UNSPECIFIED WHETHER STAGE 3A OR 3B CKD (HCC): ICD-10-CM

## 2023-01-27 DIAGNOSIS — C83.30 DIFFUSE LARGE B-CELL LYMPHOMA, UNSPECIFIED BODY REGION (HCC): ICD-10-CM

## 2023-01-27 DIAGNOSIS — E07.9 THYROID DISEASE: Primary | ICD-10-CM

## 2023-01-27 DIAGNOSIS — R05.1 ACUTE COUGH: ICD-10-CM

## 2023-01-27 DIAGNOSIS — F32.5 MAJOR DEPRESSIVE DISORDER WITH SINGLE EPISODE, IN FULL REMISSION (HCC): ICD-10-CM

## 2023-01-27 PROCEDURE — 99443 PR PHYS/QHP TELEPHONE EVALUATION 21-30 MIN: CPT | Performed by: FAMILY MEDICINE

## 2023-01-27 RX ORDER — LEVOTHYROXINE SODIUM 0.05 MG/1
50 TABLET ORAL DAILY
Qty: 30 TABLET | Refills: 5 | Status: SHIPPED | OUTPATIENT
Start: 2023-01-27

## 2023-01-27 RX ORDER — LIDOCAINE AND PRILOCAINE 25; 25 MG/G; MG/G
CREAM TOPICAL PRN
COMMUNITY
Start: 2023-01-18

## 2023-01-27 RX ORDER — GUAIFENESIN AND DEXTROMETHORPHAN HYDROBROMIDE 1200; 60 MG/1; MG/1
1 TABLET, EXTENDED RELEASE ORAL EVERY 12 HOURS
Qty: 20 TABLET | Refills: 0 | Status: SHIPPED | OUTPATIENT
Start: 2023-01-27 | End: 2023-02-06

## 2023-01-27 RX ORDER — LORAZEPAM 1 MG/1
TABLET ORAL
COMMUNITY
Start: 2023-01-05

## 2023-01-27 ASSESSMENT — ENCOUNTER SYMPTOMS
SHORTNESS OF BREATH: 0
VOMITING: 0
NAUSEA: 0

## 2023-01-27 NOTE — PROGRESS NOTES
PROGRESS NOTE  This visit was conducted via the phone with patient's consent to the visit and all associated charges to reduce the patient's risk of exposure to COVID-19 and continuity of care for an established patient. She and/or her healthcare decision maker is aware that this patient-initiated phone encounter is a billable service, with coverage as determined by her insurance carrier. She is aware that she may receive a bill and has provided verbal consent to proceed: Yes    Vitals and physical exam deferred due to telephone visit. Total Time: minutes: 11-20 minutes. SUBJECTIVE:   Osmel Cohen is a 78 y.o. female seen for a follow up visit regarding the following chief complaint:           HPI patient Is doing a phone call visit to go over her lab results    Past Medical History, Past Surgical History, Family history, Social History, and Medications were all reviewed with the patient today and updated as necessary. Current Outpatient Medications   Medication Sig Dispense Refill    lidocaine-prilocaine (EMLA) 2.5-2.5 % cream Apply topically as needed      levothyroxine (SYNTHROID) 50 MCG tablet Take 1 tablet by mouth daily 30 tablet 5    Dextromethorphan-guaiFENesin (MUCINEX DM MAXIMUM STRENGTH)  MG TB12 Take 1 tablet by mouth every 12 hours for 10 days 20 tablet 0    LORazepam (ATIVAN) 1 MG tablet       naproxen (NAPROSYN) 500 MG tablet Take 1 tablet by mouth 2 times daily as needed for Pain 60 tablet 0    amitriptyline (ELAVIL) 10 MG tablet Take 1 tablet by mouth in the morning.       sertraline (ZOLOFT) 100 MG tablet TAKE ONE TABLET BY MOUTH ONE TIME DAILY 90 tablet 3    atorvastatin (LIPITOR) 40 MG tablet TAKE ONE TABLET BY MOUTH ONE TIME DAILY 90 tablet 3    traZODone (DESYREL) 50 MG tablet TAKE ONE TABLET BY MOUTH EVERY NIGHT 90 tablet 3    omeprazole (PRILOSEC) 20 MG delayed release capsule TAKE ONE CAPSULE BY MOUTH ONE TIME DAILY 90 capsule 3    amLODIPine (NORVASC) 5 MG tablet Take 1 tablet by mouth in the morning. TAKE ONE TABLET BY MOUTH ONE TIME DAILY. 90 tablet 3    fluticasone (FLONASE) 50 MCG/ACT nasal spray 2 sprays by Nasal route daily      gabapentin (NEURONTIN) 300 MG capsule TAKE ONE CAPSULE BY MOUTH THREE TIMES A DAY       No current facility-administered medications for this visit. No Known Allergies  Patient Active Problem List   Diagnosis    CKD (chronic kidney disease) stage 3, GFR 30-59 ml/min (HCC)    Depression    Hypertension    Thyroid disease    Hypercholesterolemia    Anxiety    Diffuse large B-cell lymphoma, unspecified body region ([facility]    Major depressive disorder with single episode, in full remission [facility])     Past Medical History:   Diagnosis Date    Anxiety     Depression     Hypercholesterolemia     Hypertension     Thyroid disease      Past Surgical History:   Procedure Laterality Date    US BREAST BIOPSY W LOC DEVICE 1ST LESION RIGHT Right 11/21/2022    US BREAST NEEDLE BIOPSY RIGHT 11/21/2022 Faby Gordon MD [facility]     Family History   Problem Relation Age of Onset    Breast Cancer Neg Hx      Social History     Tobacco Use    Smoking status: Never     Passive exposure: Past    Smokeless tobacco: Never   Substance Use Topics    Alcohol use: No         Review of Systems   Constitutional:  Negative for fatigue and fever. Respiratory:  Negative for shortness of breath. Cardiovascular:  Negative for chest pain. Gastrointestinal:  Negative for nausea and vomiting. OBJECTIVE:  There were no vitals taken for this visit. Physical Exam     Medical problems and test results were reviewed with the patient today.      Recent Results (from the past 672 hour(s))   TSH    Collection Time: 01/20/23  8:04 AM   Result Value Ref Range    TSH, 3RD GENERATION 0.048 (L) 0.358 - 3.740 uIU/mL       ASSESSMENT and PLAN    Visit Diagnoses and Associated Orders       Thyroid disease    -  Primary    levothyroxine (SYNTHROID) 50 MCG tablet [4421]      TSH with Reflex [[id number] Custom]   - Future Order         Diffuse large B-cell lymphoma, unspecified body region [facility])             Major depressive disorder with single episode, in full remission ([facility]             Stage 3 chronic kidney disease, unspecified whether stage 3a or 3b CKD (HCC)             Acute cough        Dextromethorphan-guaiFENesin (MUCINEX DM MAXIMUM STRENGTH)  MG TB12 [[id number]]           ORDERS WITHOUT AN ASSOCIATED DIAGNOSIS    lidocaine-prilocaine (EMLA) 2.5-2.5 % cream [58081]      LORazepam (ATIVAN) 1 MG tablet [4573]                  Diagnosis Orders   1. Thyroid disease  levothyroxine (SYNTHROID) 50 MCG tablet    TSH with Reflex      2. Diffuse large B-cell lymphoma, unspecified body region ([facility]        3. Major depressive disorder with single episode, in full remission ([facility]        4. Stage 3 chronic kidney disease, unspecified whether stage 3a or 3b CKD ([facility]        5. Acute cough  Dextromethorphan-guaiFENesin (MUCINEX DM MAXIMUM STRENGTH)  MG TB12      , Diagnoses and all orders for this visit:    Thyroid disease  -     levothyroxine (SYNTHROID) 50 MCG tablet; Take 1 tablet by mouth daily  -     TSH with Reflex;  Future    Diffuse large B-cell lymphoma, unspecified body region [facility])    Major depressive disorder with single episode, in full remission ([facility]    Stage 3 chronic kidney disease, unspecified whether stage 3a or 3b CKD (HCC)    Acute cough  -     Dextromethorphan-guaiFENesin (MUCINEX DM MAXIMUM STRENGTH)  MG TB12; Take 1 tablet by mouth every 12 hours for 10 days  , Recommended creasing her Synthroid to 50 mcg and rechecking a TSH in the near future we will call her back with the results and plan

## 2023-02-13 DIAGNOSIS — F41.9 ANXIETY: ICD-10-CM

## 2023-02-14 RX ORDER — ALPRAZOLAM 1 MG/1
1 TABLET ORAL 2 TIMES DAILY
Qty: 60 TABLET | Refills: 1 | Status: SHIPPED | OUTPATIENT
Start: 2023-02-14 | End: 2023-03-16

## 2023-03-16 ENCOUNTER — NURSE ONLY (OUTPATIENT)
Dept: FAMILY MEDICINE CLINIC | Facility: CLINIC | Age: 78
End: 2023-03-16
Payer: MEDICARE

## 2023-03-16 DIAGNOSIS — E07.9 THYROID DISEASE: Primary | ICD-10-CM

## 2023-03-16 PROCEDURE — 36415 COLL VENOUS BLD VENIPUNCTURE: CPT | Performed by: FAMILY MEDICINE

## 2023-03-17 LAB
T4 FREE SERPL-MCNC: 1.9 NG/DL (ref 0.78–1.46)
TSH W FREE THYROID IF ABNORMAL: 0.11 UIU/ML (ref 0.36–3.74)

## 2023-03-21 ENCOUNTER — TELEMEDICINE (OUTPATIENT)
Dept: FAMILY MEDICINE CLINIC | Facility: CLINIC | Age: 78
End: 2023-03-21
Payer: MEDICARE

## 2023-03-21 DIAGNOSIS — F41.1 GENERALIZED ANXIETY DISORDER: ICD-10-CM

## 2023-03-21 DIAGNOSIS — F51.04 CHRONIC INSOMNIA: ICD-10-CM

## 2023-03-21 DIAGNOSIS — E07.9 THYROID DISEASE: Primary | ICD-10-CM

## 2023-03-21 DIAGNOSIS — F32.5 MAJOR DEPRESSIVE DISORDER WITH SINGLE EPISODE, IN FULL REMISSION (HCC): ICD-10-CM

## 2023-03-21 PROCEDURE — 99442 PR PHYS/QHP TELEPHONE EVALUATION 11-20 MIN: CPT | Performed by: FAMILY MEDICINE

## 2023-03-21 RX ORDER — LEVOTHYROXINE SODIUM 0.05 MG/1
50 TABLET ORAL DAILY
Qty: 30 TABLET | Refills: 5 | Status: SHIPPED | OUTPATIENT
Start: 2023-03-21

## 2023-03-21 RX ORDER — AMITRIPTYLINE HYDROCHLORIDE 25 MG/1
25 TABLET, FILM COATED ORAL DAILY
Qty: 90 TABLET | Refills: 3 | Status: SHIPPED | OUTPATIENT
Start: 2023-03-21

## 2023-03-21 SDOH — ECONOMIC STABILITY: HOUSING INSECURITY
IN THE LAST 12 MONTHS, WAS THERE A TIME WHEN YOU DID NOT HAVE A STEADY PLACE TO SLEEP OR SLEPT IN A SHELTER (INCLUDING NOW)?: NO

## 2023-03-21 SDOH — ECONOMIC STABILITY: INCOME INSECURITY: HOW HARD IS IT FOR YOU TO PAY FOR THE VERY BASICS LIKE FOOD, HOUSING, MEDICAL CARE, AND HEATING?: NOT HARD AT ALL

## 2023-03-21 SDOH — ECONOMIC STABILITY: FOOD INSECURITY: WITHIN THE PAST 12 MONTHS, THE FOOD YOU BOUGHT JUST DIDN'T LAST AND YOU DIDN'T HAVE MONEY TO GET MORE.: NEVER TRUE

## 2023-03-21 SDOH — ECONOMIC STABILITY: FOOD INSECURITY: WITHIN THE PAST 12 MONTHS, YOU WORRIED THAT YOUR FOOD WOULD RUN OUT BEFORE YOU GOT MONEY TO BUY MORE.: NEVER TRUE

## 2023-03-21 ASSESSMENT — PATIENT HEALTH QUESTIONNAIRE - PHQ9
SUM OF ALL RESPONSES TO PHQ9 QUESTIONS 1 & 2: 4
8. MOVING OR SPEAKING SO SLOWLY THAT OTHER PEOPLE COULD HAVE NOTICED. OR THE OPPOSITE, BEING SO FIGETY OR RESTLESS THAT YOU HAVE BEEN MOVING AROUND A LOT MORE THAN USUAL: 0
SUM OF ALL RESPONSES TO PHQ QUESTIONS 1-9: 8
1. LITTLE INTEREST OR PLEASURE IN DOING THINGS: 2
3. TROUBLE FALLING OR STAYING ASLEEP: 2
SUM OF ALL RESPONSES TO PHQ QUESTIONS 1-9: 8
5. POOR APPETITE OR OVEREATING: 0
4. FEELING TIRED OR HAVING LITTLE ENERGY: 2
SUM OF ALL RESPONSES TO PHQ QUESTIONS 1-9: 8
7. TROUBLE CONCENTRATING ON THINGS, SUCH AS READING THE NEWSPAPER OR WATCHING TELEVISION: 0
6. FEELING BAD ABOUT YOURSELF - OR THAT YOU ARE A FAILURE OR HAVE LET YOURSELF OR YOUR FAMILY DOWN: 0
2. FEELING DOWN, DEPRESSED OR HOPELESS: 2
10. IF YOU CHECKED OFF ANY PROBLEMS, HOW DIFFICULT HAVE THESE PROBLEMS MADE IT FOR YOU TO DO YOUR WORK, TAKE CARE OF THINGS AT HOME, OR GET ALONG WITH OTHER PEOPLE: 1
SUM OF ALL RESPONSES TO PHQ QUESTIONS 1-9: 8
9. THOUGHTS THAT YOU WOULD BE BETTER OFF DEAD, OR OF HURTING YOURSELF: 0

## 2023-03-21 NOTE — PROGRESS NOTES
Reflex    Collection Time: 03/16/23  8:14 AM   Result Value Ref Range    TSH w Free Thyroid if Abnormal 0.11 (L) 0.358 - 3.740 UIU/ML   T4, Free    Collection Time: 03/16/23  8:14 AM   Result Value Ref Range    T4 Free 1.9 (H) 0.78 - 1.46 NG/DL       ASSESSMENT and PLAN    Visit Diagnoses and Associated Orders       Thyroid disease    -  Primary    TSH with Reflex [[id number] Custom]   - Future Order    levothyroxine (SYNTHROID) 50 MCG tablet [4421]           Generalized anxiety disorder        amitriptyline (ELAVIL) 25 MG tablet [435]           Major depressive disorder with single episode, in full remission (HCC)        amitriptyline (ELAVIL) 25 MG tablet [435]           Chronic insomnia        amitriptyline (ELAVIL) 25 MG tablet [435]                       Diagnosis Orders   1. Thyroid disease  TSH with Reflex    levothyroxine (SYNTHROID) 50 MCG tablet      2. Generalized anxiety disorder  amitriptyline (ELAVIL) 25 MG tablet      3. Major depressive disorder with single episode, in full remission (HCC)  amitriptyline (ELAVIL) 25 MG tablet      4. Chronic insomnia  amitriptyline (ELAVIL) 25 MG tablet      , Елена Estrada was seen today for discuss labs. Diagnoses and all orders for this visit:    Thyroid disease  -     TSH with Reflex; Future  -     levothyroxine (SYNTHROID) 50 MCG tablet; Take 1 tablet by mouth daily    Generalized anxiety disorder  -     amitriptyline (ELAVIL) 25 MG tablet; Take 1 tablet by mouth daily    Major depressive disorder with single episode, in full remission (HCC)  -     amitriptyline (ELAVIL) 25 MG tablet; Take 1 tablet by mouth daily    Chronic insomnia  -     amitriptyline (ELAVIL) 25 MG tablet;  Take 1 tablet by mouth daily    , We will increase her Elavil 25 mg to help her sleep and her anxiety and depression we will start her on Synthroid 50 (actually send it back to the pharmacy again/reordered) and we will recheck a TSH in 6 weeks and call back with the results and plan

## 2023-04-04 ENCOUNTER — NURSE ONLY (OUTPATIENT)
Dept: FAMILY MEDICINE CLINIC | Facility: CLINIC | Age: 78
End: 2023-04-04
Payer: MEDICARE

## 2023-04-04 DIAGNOSIS — Z79.899 ENCOUNTER FOR LONG-TERM (CURRENT) USE OF MEDICATIONS: Primary | ICD-10-CM

## 2023-04-04 LAB
ALBUMIN SERPL-MCNC: 3.8 G/DL (ref 3.2–4.6)
ALBUMIN/GLOB SERPL: 1.5 (ref 0.4–1.6)
ALP SERPL-CCNC: 112 U/L (ref 50–136)
ALT SERPL-CCNC: 20 U/L (ref 12–65)
AST SERPL-CCNC: 15 U/L (ref 15–37)
BILIRUB DIRECT SERPL-MCNC: 0.2 MG/DL
BILIRUB SERPL-MCNC: 0.6 MG/DL (ref 0.2–1.1)
GLOBULIN SER CALC-MCNC: 2.6 G/DL (ref 2.8–4.5)
GRANS ABSOLUTE, POC: ABNORMAL K/UL
GRANULOCYTES %, POC: 81.5 %
HEMATOCRIT, POC: ABNORMAL %
HEMOGLOBIN, POC: ABNORMAL G/DL
LYMPHOCYTE %, POC: 12.8 %
LYMPHS ABSOLUTE, POC: 1.6 K/UL
MCH, POC: ABNORMAL PG (ref 40–?)
MCHC, POC: 31.6
MCV, POC: ABNORMAL
MONOCYTE %, POC: 5.7 %
MONOCYTE, ABSOLUTE POC: 0.7 K/UL
MPV, POC: 7.2 FL
PLATELET COUNT, POC: 255 K/UL
PROT SERPL-MCNC: 6.4 G/DL (ref 6.3–8.2)
RBC, POC: ABNORMAL M/UL
RDW, POC: ABNORMAL %
WBC, POC: ABNORMAL K/UL

## 2023-04-04 PROCEDURE — 85025 COMPLETE CBC W/AUTO DIFF WBC: CPT | Performed by: FAMILY MEDICINE

## 2023-04-04 PROCEDURE — 36415 COLL VENOUS BLD VENIPUNCTURE: CPT | Performed by: FAMILY MEDICINE

## 2023-04-08 NOTE — PROGRESS NOTES
PROGRESS NOTE  This visit was conducted via the phone with patient's consent to the visit and all associated charges to reduce the patient's risk of exposure to COVID-19 and continuity of care for an established patient. She and/or her healthcare decision maker is aware that this patient-initiated phone encounter is a billable service, with coverage as determined by her insurance carrier. She is aware that she may receive a bill and has provided verbal consent to proceed: Yes    Vitals and physical exam deferred due to telephone visit. Total Time: minutes: 11-20 minutes. SUBJECTIVE:   Mary Martinez is a 78 y.o. female seen for a follow up visit regarding the following chief complaint:     Chief Complaint   Patient presents with    Follow-up     LAB FU/ REQUESTING TO SEE A DIFFERENT PAIN MANAGEMENT PROVIDER. HPI patient is doing a phone call visit to go over her lab results and states she wants to see another pain management group for her postherpetic neuropathy      Past Medical History, Past Surgical History, Family history, Social History, and Medications were all reviewed with the patient today and updated as necessary. Current Outpatient Medications   Medication Sig Dispense Refill    levothyroxine (SYNTHROID) 100 MCG tablet Take 1 tablet by mouth Daily 30 tablet 3    sertraline (ZOLOFT) 100 MG tablet TAKE ONE TABLET BY MOUTH ONE TIME DAILY 90 tablet 3    atorvastatin (LIPITOR) 40 MG tablet TAKE ONE TABLET BY MOUTH ONE TIME DAILY 90 tablet 3    omeprazole (PRILOSEC) 20 MG delayed release capsule TAKE ONE CAPSULE BY MOUTH ONE TIME DAILY 90 capsule 3    amLODIPine (NORVASC) 5 MG tablet Take 1 tablet by mouth in the morning. TAKE ONE TABLET BY MOUTH ONE TIME DAILY. 90 tablet 3    amitriptyline (ELAVIL) 10 MG tablet Take 1 tablet by mouth in the morning.       traZODone (DESYREL) 50 MG tablet TAKE ONE TABLET BY MOUTH EVERY NIGHT 90 tablet 3    fluticasone (FLONASE) 50 MCG/ACT nasal spray 2 sprays by Nasal route daily      gabapentin (NEURONTIN) 300 MG capsule TAKE ONE CAPSULE BY MOUTH THREE TIMES A DAY       No current facility-administered medications for this visit. No Known Allergies  Patient Active Problem List   Diagnosis    CKD (chronic kidney disease) stage 3, GFR 30-59 ml/min ([facility])    Depression    Hypertension    Thyroid disease    Hypercholesterolemia    Anxiety     Past Medical History:   Diagnosis Date    Anxiety     Depression     Hypercholesterolemia     Hypertension     Thyroid disease      No past surgical history on file. Family History   Problem Relation Age of Onset    Breast Cancer Neg Hx      Social History     Tobacco Use    Smoking status: Never     Passive exposure: Past    Smokeless tobacco: Never   Substance Use Topics    Alcohol use: No         Review of Systems   Constitutional:  Negative for fatigue and fever. Respiratory:  Negative for shortness of breath. Cardiovascular:  Negative for chest pain. Gastrointestinal:  Negative for nausea and vomiting. OBJECTIVE:  There were no vitals taken for this visit. Physical Exam     Medical problems and test results were reviewed with the patient today. Recent Results (from the past 672 hour(s))   TSH    Collection Time: 09/29/22  8:51 AM   Result Value Ref Range    TSH, 3RD GENERATION 6.760 (H) 0.358 - 3.740 uIU/mL       ASSESSMENT and PLAN    Visit Diagnoses and Associated Orders       Thyroid disease    -  Primary    TSH [[id number] Custom]   - Future Order    levothyroxine (SYNTHROID) 100 MCG tablet [[id number]]           Other postherpetic nervous system involvement        External Referral To Pain Medicine [[id number] Custom]                       Diagnosis Orders   1. Thyroid disease  TSH    levothyroxine (SYNTHROID) 100 MCG tablet      2. Other postherpetic nervous system involvement  External Referral To Pain Medicine      , Ashley Middleton was seen today for follow-up.     Diagnoses and all orders for this visit:    Thyroid disease  -     TSH; Future  -     levothyroxine (SYNTHROID) 100 MCG tablet;  Take 1 tablet by mouth Daily    Other postherpetic nervous system involvement  -     External Referral To Pain Medicine    , Reviewed her labs we will check a TSH after increasing her Synthroid to 100 mcg and check a TSH in 6 weeks and call her back with results and plan in the meantime we will refer to another pain management group for evaluation of her postherpetic neuralgia Call Dr. Graham's office to schedule a post-operative appointment in 2 weeks.     FOR URGENT POSTOPERATIVE PROBLEMS, CALL BELLA AT DR. GRAHAM'S [facility]LINE: [phone].   Call Dr. Graham's office to schedule a post-operative appointment in 2 weeks.     FOR URGENT POSTOPERATIVE PROBLEMS, CALL BELLA AT DR. GRAHAM[facility]S [facility]: [phone].    Call Dr. Cardenas's office to schedule a post-operative appointment in 2 weeks.

## 2023-05-05 ENCOUNTER — TELEPHONE (OUTPATIENT)
Dept: FAMILY MEDICINE CLINIC | Facility: CLINIC | Age: 78
End: 2023-05-05

## 2023-05-05 DIAGNOSIS — C83.30 DIFFUSE LARGE B-CELL LYMPHOMA, UNSPECIFIED BODY REGION (HCC): ICD-10-CM

## 2023-05-05 DIAGNOSIS — R53.1 WEAKNESS: Primary | ICD-10-CM

## 2023-05-08 ENCOUNTER — TELEPHONE (OUTPATIENT)
Dept: FAMILY MEDICINE CLINIC | Facility: CLINIC | Age: 78
End: 2023-05-08

## 2023-05-08 NOTE — TELEPHONE ENCOUNTER
----- Message from Chantell Lara DO sent at 5/5/2023  3:34 PM EDT -----  Regarding: RE: referral request  Does her oncologist know about all this?  ----- Message -----  From: Caroline Murry MA  Sent: 5/5/2023   3:28 PM EDT  To: Chantell Lara DO  Subject: referral request                                 Reason for referral request? Pt finished chemotherapy 2 weeks ago and has   been in the bed since 4/25/2023 unable to move. Pt's daughter states she   has been carrying her to the bathroom and cleaning her because pt cannot   move herself. Pt's daughter wants to know if an order can be put in for   home Physical Therapy for pt. And has additional questions about insurance   coverage. Pt's daughter states it's okay to leave a detail voice message   if the called is missed.    Provider patient wants to be referred to(if known):

## 2023-05-30 DIAGNOSIS — E07.9 THYROID DISEASE: ICD-10-CM

## 2023-05-30 RX ORDER — LEVOTHYROXINE SODIUM 88 MCG
TABLET ORAL
Qty: 30 TABLET | Refills: 5 | OUTPATIENT
Start: 2023-05-30

## 2023-06-29 DIAGNOSIS — E07.9 THYROID DISEASE: ICD-10-CM

## 2023-06-29 RX ORDER — LEVOTHYROXINE SODIUM 88 MCG
TABLET ORAL
Qty: 30 TABLET | Refills: 5 | OUTPATIENT
Start: 2023-06-29

## 2023-08-08 ENCOUNTER — OFFICE VISIT (OUTPATIENT)
Dept: FAMILY MEDICINE CLINIC | Facility: CLINIC | Age: 78
End: 2023-08-08
Payer: MEDICARE

## 2023-08-08 VITALS
HEART RATE: 51 BPM | RESPIRATION RATE: 16 BRPM | BODY MASS INDEX: 29.81 KG/M2 | HEIGHT: 58 IN | TEMPERATURE: 97.3 F | OXYGEN SATURATION: 100 % | WEIGHT: 142 LBS

## 2023-08-08 DIAGNOSIS — Z00.00 ROUTINE GENERAL MEDICAL EXAMINATION AT A HEALTH CARE FACILITY: Primary | ICD-10-CM

## 2023-08-08 DIAGNOSIS — E78.00 HYPERCHOLESTEROLEMIA: ICD-10-CM

## 2023-08-08 DIAGNOSIS — F51.04 CHRONIC INSOMNIA: ICD-10-CM

## 2023-08-08 DIAGNOSIS — Z12.11 SPECIAL SCREENING FOR MALIGNANT NEOPLASMS, COLON: ICD-10-CM

## 2023-08-08 DIAGNOSIS — F41.1 GENERALIZED ANXIETY DISORDER: ICD-10-CM

## 2023-08-08 DIAGNOSIS — Z13.31 SCREENING FOR DEPRESSION: ICD-10-CM

## 2023-08-08 DIAGNOSIS — N18.32 STAGE 3B CHRONIC KIDNEY DISEASE (HCC): ICD-10-CM

## 2023-08-08 DIAGNOSIS — I10 ESSENTIAL HYPERTENSION: ICD-10-CM

## 2023-08-08 DIAGNOSIS — F32.5 MAJOR DEPRESSIVE DISORDER WITH SINGLE EPISODE, IN FULL REMISSION (HCC): ICD-10-CM

## 2023-08-08 DIAGNOSIS — E07.9 THYROID DISEASE: ICD-10-CM

## 2023-08-08 DIAGNOSIS — Z91.09 ENVIRONMENTAL ALLERGIES: ICD-10-CM

## 2023-08-08 DIAGNOSIS — Z00.00 MEDICARE ANNUAL WELLNESS VISIT, SUBSEQUENT: ICD-10-CM

## 2023-08-08 DIAGNOSIS — C83.30 DIFFUSE LARGE B-CELL LYMPHOMA, UNSPECIFIED BODY REGION (HCC): ICD-10-CM

## 2023-08-08 PROCEDURE — 1123F ACP DISCUSS/DSCN MKR DOCD: CPT | Performed by: FAMILY MEDICINE

## 2023-08-08 PROCEDURE — 3017F COLORECTAL CA SCREEN DOC REV: CPT | Performed by: FAMILY MEDICINE

## 2023-08-08 PROCEDURE — G0439 PPPS, SUBSEQ VISIT: HCPCS | Performed by: FAMILY MEDICINE

## 2023-08-08 RX ORDER — LEVOTHYROXINE SODIUM 0.05 MG/1
50 TABLET ORAL DAILY
Qty: 30 TABLET | Refills: 1 | Status: SHIPPED | OUTPATIENT
Start: 2023-08-08

## 2023-08-08 RX ORDER — ALPRAZOLAM 1 MG/1
TABLET ORAL
COMMUNITY
Start: 2023-07-31

## 2023-08-08 RX ORDER — ATORVASTATIN CALCIUM 40 MG/1
TABLET, FILM COATED ORAL
Qty: 90 TABLET | Refills: 3 | Status: SHIPPED | OUTPATIENT
Start: 2023-08-08

## 2023-08-08 RX ORDER — TRAMADOL HYDROCHLORIDE 50 MG/1
TABLET ORAL
COMMUNITY
Start: 2023-05-16

## 2023-08-08 ASSESSMENT — PATIENT HEALTH QUESTIONNAIRE - PHQ9
SUM OF ALL RESPONSES TO PHQ QUESTIONS 1-9: 4
3. TROUBLE FALLING OR STAYING ASLEEP: 0
9. THOUGHTS THAT YOU WOULD BE BETTER OFF DEAD, OR OF HURTING YOURSELF: 0
SUM OF ALL RESPONSES TO PHQ9 QUESTIONS 1 & 2: 1
SUM OF ALL RESPONSES TO PHQ QUESTIONS 1-9: 4
SUM OF ALL RESPONSES TO PHQ QUESTIONS 1-9: 4
8. MOVING OR SPEAKING SO SLOWLY THAT OTHER PEOPLE COULD HAVE NOTICED. OR THE OPPOSITE, BEING SO FIGETY OR RESTLESS THAT YOU HAVE BEEN MOVING AROUND A LOT MORE THAN USUAL: 1
4. FEELING TIRED OR HAVING LITTLE ENERGY: 1
2. FEELING DOWN, DEPRESSED OR HOPELESS: 1
1. LITTLE INTEREST OR PLEASURE IN DOING THINGS: 0
10. IF YOU CHECKED OFF ANY PROBLEMS, HOW DIFFICULT HAVE THESE PROBLEMS MADE IT FOR YOU TO DO YOUR WORK, TAKE CARE OF THINGS AT HOME, OR GET ALONG WITH OTHER PEOPLE: 1
5. POOR APPETITE OR OVEREATING: 0
7. TROUBLE CONCENTRATING ON THINGS, SUCH AS READING THE NEWSPAPER OR WATCHING TELEVISION: 0
6. FEELING BAD ABOUT YOURSELF - OR THAT YOU ARE A FAILURE OR HAVE LET YOURSELF OR YOUR FAMILY DOWN: 1
SUM OF ALL RESPONSES TO PHQ QUESTIONS 1-9: 4

## 2023-08-08 ASSESSMENT — LIFESTYLE VARIABLES
HOW MANY STANDARD DRINKS CONTAINING ALCOHOL DO YOU HAVE ON A TYPICAL DAY: PATIENT DOES NOT DRINK
HOW OFTEN DO YOU HAVE A DRINK CONTAINING ALCOHOL: NEVER

## 2023-08-08 NOTE — PROGRESS NOTES
Medicare Annual Wellness Visit    Geraldo Morillo is here for Medicare AWV    Assessment & Plan   Routine general medical examination at a health care facility  Hypercholesterolemia  -     atorvastatin (LIPITOR) 40 MG tablet; TAKE ONE TABLET BY MOUTH ONE TIME DAILY, Disp-90 tablet, R-3Normal  -     Lipid Panel; Future  Thyroid disease  -     levothyroxine (SYNTHROID) 50 MCG tablet; Take 1 tablet by mouth daily, Disp-30 tablet, R-1Normal  -     TSH with Reflex; Future  Stage 3b chronic kidney disease ([address])  Essential hypertension  -     Comprehensive Metabolic Panel; Future  -     CBC with Auto Differential; Future  -     AMB POC URINALYSIS DIP STICK AUTO W/O MICRO; Future  Diffuse large B-cell lymphoma, unspecified body region ([address])  Generalized anxiety disorder  Major depressive disorder with single episode, in full remission ([address])  Chronic insomnia  Environmental allergies  Medicare annual wellness visit, subsequent  Screening for depression  Special screening for malignant neoplasms, colon  -     [address] [facility] - Colonoscopy    Recommendations for Preventive Services Due: see orders and patient instructions/AVS.  Recommended screening schedule for the next 5-10 years is provided to the patient in written form: see Patient Instructions/AVS.     No follow-ups on file. Subjective   Patient presents office today for complete physical without complaints    Patient's complete Health Risk Assessment and screening values have been reviewed and are found in Flowsheets. The following problems were reviewed today and where indicated follow up appointments were made and/or referrals ordered. Positive Risk Factor Screenings with Interventions:                Opioid Risk: (Low risk score <55) Opioid risk score: 35    Patient is low risk for opioid use disorder or overdose.   Last PDMP Aidan Delarosa as Reviewed:  Review User Review Instant Review Result                    Weight and

## 2023-08-11 ENCOUNTER — NURSE ONLY (OUTPATIENT)
Dept: FAMILY MEDICINE CLINIC | Facility: CLINIC | Age: 78
End: 2023-08-11

## 2023-08-11 DIAGNOSIS — E07.9 THYROID DISEASE: ICD-10-CM

## 2023-08-11 DIAGNOSIS — I10 ESSENTIAL HYPERTENSION: ICD-10-CM

## 2023-08-11 DIAGNOSIS — E78.00 HYPERCHOLESTEROLEMIA: ICD-10-CM

## 2023-08-11 LAB
ALBUMIN SERPL-MCNC: 3.8 G/DL (ref 3.2–4.6)
ALBUMIN/GLOB SERPL: 1.1 (ref 0.4–1.6)
ALP SERPL-CCNC: 190 U/L (ref 50–136)
ALT SERPL-CCNC: 19 U/L (ref 12–65)
ANION GAP SERPL CALC-SCNC: 4 MMOL/L (ref 2–11)
AST SERPL-CCNC: 20 U/L (ref 15–37)
BASOPHILS # BLD: 0.1 K/UL (ref 0–0.2)
BASOPHILS NFR BLD: 2 % (ref 0–2)
BILIRUB SERPL-MCNC: 0.4 MG/DL (ref 0.2–1.1)
BILIRUBIN, URINE, POC: NEGATIVE
BLOOD URINE, POC: NEGATIVE
BUN SERPL-MCNC: 23 MG/DL (ref 8–23)
CALCIUM SERPL-MCNC: 9.7 MG/DL (ref 8.3–10.4)
CHLORIDE SERPL-SCNC: 106 MMOL/L (ref 101–110)
CHOLEST SERPL-MCNC: 194 MG/DL
CO2 SERPL-SCNC: 29 MMOL/L (ref 21–32)
CREAT SERPL-MCNC: 1.3 MG/DL (ref 0.6–1)
DIFFERENTIAL METHOD BLD: ABNORMAL
EOSINOPHIL # BLD: 0.2 K/UL (ref 0–0.8)
EOSINOPHIL NFR BLD: 4 % (ref 0.5–7.8)
ERYTHROCYTE [DISTWIDTH] IN BLOOD BY AUTOMATED COUNT: 12.4 % (ref 11.9–14.6)
GLOBULIN SER CALC-MCNC: 3.4 G/DL (ref 2.8–4.5)
GLUCOSE SERPL-MCNC: 88 MG/DL (ref 65–100)
GLUCOSE URINE, POC: NEGATIVE
HCT VFR BLD AUTO: 39.2 % (ref 35.8–46.3)
HDLC SERPL-MCNC: 82 MG/DL (ref 40–60)
HDLC SERPL: 2.4
HGB BLD-MCNC: 12.4 G/DL (ref 11.7–15.4)
IMM GRANULOCYTES # BLD AUTO: 0 K/UL (ref 0–0.5)
IMM GRANULOCYTES NFR BLD AUTO: 0 % (ref 0–5)
KETONES, URINE, POC: NEGATIVE
LDLC SERPL CALC-MCNC: 87.8 MG/DL
LEUKOCYTE ESTERASE, URINE, POC: ABNORMAL
LYMPHOCYTES # BLD: 3 K/UL (ref 0.5–4.6)
LYMPHOCYTES NFR BLD: 46 % (ref 13–44)
MCH RBC QN AUTO: 30.5 PG (ref 26.1–32.9)
MCHC RBC AUTO-ENTMCNC: 31.6 G/DL (ref 31.4–35)
MCV RBC AUTO: 96.6 FL (ref 82–102)
MONOCYTES # BLD: 0.5 K/UL (ref 0.1–1.3)
MONOCYTES NFR BLD: 8 % (ref 4–12)
NEUTS SEG # BLD: 2.5 K/UL (ref 1.7–8.2)
NEUTS SEG NFR BLD: 40 % (ref 43–78)
NITRITE, URINE, POC: NEGATIVE
NRBC # BLD: 0 K/UL (ref 0–0.2)
PH, URINE, POC: 7.5 (ref 4.6–8)
PLATELET # BLD AUTO: 248 K/UL (ref 150–450)
PMV BLD AUTO: 9.8 FL (ref 9.4–12.3)
POTASSIUM SERPL-SCNC: 4.5 MMOL/L (ref 3.5–5.1)
PROT SERPL-MCNC: 7.2 G/DL (ref 6.3–8.2)
PROTEIN,URINE, POC: NEGATIVE
RBC # BLD AUTO: 4.06 M/UL (ref 4.05–5.2)
SODIUM SERPL-SCNC: 139 MMOL/L (ref 133–143)
SPECIFIC GRAVITY, URINE, POC: 1.01 (ref 1–1.03)
TRIGL SERPL-MCNC: 121 MG/DL (ref 35–150)
TSH W FREE THYROID IF ABNORMAL: 2.13 UIU/ML (ref 0.36–3.74)
URINALYSIS CLARITY, POC: ABNORMAL
URINALYSIS COLOR, POC: YELLOW
UROBILINOGEN, POC: NORMAL
VLDLC SERPL CALC-MCNC: 24.2 MG/DL (ref 6–23)
WBC # BLD AUTO: 6.3 K/UL (ref 4.3–11.1)

## 2023-08-22 ENCOUNTER — OFFICE VISIT (OUTPATIENT)
Dept: FAMILY MEDICINE CLINIC | Facility: CLINIC | Age: 78
End: 2023-08-22
Payer: MEDICARE

## 2023-08-22 VITALS
RESPIRATION RATE: 17 BRPM | BODY MASS INDEX: 28.76 KG/M2 | HEIGHT: 58 IN | DIASTOLIC BLOOD PRESSURE: 80 MMHG | SYSTOLIC BLOOD PRESSURE: 137 MMHG | HEART RATE: 76 BPM | WEIGHT: 137 LBS | OXYGEN SATURATION: 98 %

## 2023-08-22 DIAGNOSIS — B02.29 POST HERPETIC NEURALGIA: ICD-10-CM

## 2023-08-22 DIAGNOSIS — E78.00 HYPERCHOLESTEROLEMIA: ICD-10-CM

## 2023-08-22 DIAGNOSIS — G89.4 CHRONIC PAIN SYNDROME: ICD-10-CM

## 2023-08-22 DIAGNOSIS — I10 ESSENTIAL HYPERTENSION: Primary | ICD-10-CM

## 2023-08-22 DIAGNOSIS — N18.32 STAGE 3B CHRONIC KIDNEY DISEASE (HCC): ICD-10-CM

## 2023-08-22 PROCEDURE — 1090F PRES/ABSN URINE INCON ASSESS: CPT | Performed by: FAMILY MEDICINE

## 2023-08-22 PROCEDURE — G8399 PT W/DXA RESULTS DOCUMENT: HCPCS | Performed by: FAMILY MEDICINE

## 2023-08-22 PROCEDURE — 1123F ACP DISCUSS/DSCN MKR DOCD: CPT | Performed by: FAMILY MEDICINE

## 2023-08-22 PROCEDURE — G8417 CALC BMI ABV UP PARAM F/U: HCPCS | Performed by: FAMILY MEDICINE

## 2023-08-22 PROCEDURE — 3079F DIAST BP 80-89 MM HG: CPT | Performed by: FAMILY MEDICINE

## 2023-08-22 PROCEDURE — 3017F COLORECTAL CA SCREEN DOC REV: CPT | Performed by: FAMILY MEDICINE

## 2023-08-22 PROCEDURE — 99214 OFFICE O/P EST MOD 30 MIN: CPT | Performed by: FAMILY MEDICINE

## 2023-08-22 PROCEDURE — G8427 DOCREV CUR MEDS BY ELIG CLIN: HCPCS | Performed by: FAMILY MEDICINE

## 2023-08-22 PROCEDURE — 3075F SYST BP GE 130 - 139MM HG: CPT | Performed by: FAMILY MEDICINE

## 2023-08-22 PROCEDURE — 1036F TOBACCO NON-USER: CPT | Performed by: FAMILY MEDICINE

## 2023-08-22 ASSESSMENT — ENCOUNTER SYMPTOMS
RHINORRHEA: 0
SINUS PAIN: 0
DIARRHEA: 0
ABDOMINAL PAIN: 0
COUGH: 0
SHORTNESS OF BREATH: 0

## 2023-08-28 ENCOUNTER — CLINICAL DOCUMENTATION (OUTPATIENT)
Dept: SURGERY | Age: 78
End: 2023-08-28

## 2023-08-28 NOTE — PROGRESS NOTES
Referral received for routine colonoscopy- screening or surveillance only. Chart reviewed by me on August 28, 2023.  No prior screening documented    Pt found to be acceptable candidate for direct scheduling if they are interested with the following special instructions (if any):

## 2023-11-14 DIAGNOSIS — F32.5 MAJOR DEPRESSIVE DISORDER WITH SINGLE EPISODE, IN FULL REMISSION (HCC): Primary | ICD-10-CM

## 2023-11-14 DIAGNOSIS — F41.9 ANXIETY: ICD-10-CM

## 2023-11-14 RX ORDER — ALPRAZOLAM 1 MG/1
TABLET ORAL
Qty: 60 TABLET | Refills: 5 | Status: SHIPPED | OUTPATIENT
Start: 2023-11-14 | End: 2023-12-15

## 2023-11-25 ENCOUNTER — APPOINTMENT (OUTPATIENT)
Dept: GENERAL RADIOLOGY | Age: 78
End: 2023-11-25
Payer: MEDICARE

## 2023-11-25 ENCOUNTER — HOSPITAL ENCOUNTER (EMERGENCY)
Age: 78
Discharge: HOME | End: 2023-11-25
Payer: MEDICARE

## 2023-11-25 VITALS
HEIGHT: 62 IN | SYSTOLIC BLOOD PRESSURE: 137 MMHG | BODY MASS INDEX: 33.13 KG/M2 | RESPIRATION RATE: 16 BRPM | OXYGEN SATURATION: 97 % | HEART RATE: 86 BPM | DIASTOLIC BLOOD PRESSURE: 90 MMHG | WEIGHT: 180 LBS | TEMPERATURE: 97.7 F

## 2023-11-25 DIAGNOSIS — M25.552 BILATERAL HIP PAIN: Primary | ICD-10-CM

## 2023-11-25 DIAGNOSIS — M25.551 BILATERAL HIP PAIN: Primary | ICD-10-CM

## 2023-11-25 PROCEDURE — 99283 EMERGENCY DEPT VISIT LOW MDM: CPT

## 2023-11-25 PROCEDURE — 73522 X-RAY EXAM HIPS BI 3-4 VIEWS: CPT

## 2023-11-25 PROCEDURE — 6370000000 HC RX 637 (ALT 250 FOR IP): Performed by: NURSE PRACTITIONER

## 2023-11-25 RX ORDER — METHOCARBAMOL 500 MG/1
500 TABLET, FILM COATED ORAL 3 TIMES DAILY
Qty: 20 TABLET | Refills: 0 | Status: SHIPPED | OUTPATIENT
Start: 2023-11-25 | End: 2023-12-05

## 2023-11-25 RX ORDER — OXYCODONE HYDROCHLORIDE 5 MG/1
5 TABLET ORAL
Status: COMPLETED | OUTPATIENT
Start: 2023-11-25 | End: 2023-11-25

## 2023-11-25 RX ORDER — OXYCODONE HYDROCHLORIDE 5 MG/1
5 TABLET ORAL EVERY 8 HOURS PRN
Qty: 11 TABLET | Refills: 0 | Status: SHIPPED | OUTPATIENT
Start: 2023-11-25 | End: 2023-11-29

## 2023-11-25 RX ADMIN — OXYCODONE 5 MG: 5 TABLET ORAL at 10:07

## 2023-11-25 ASSESSMENT — PAIN SCALES - GENERAL: PAINLEVEL_OUTOF10: 10

## 2023-11-25 ASSESSMENT — PAIN - FUNCTIONAL ASSESSMENT: PAIN_FUNCTIONAL_ASSESSMENT: 0-10

## 2023-11-25 NOTE — ED PROVIDER NOTES
Emergency Department Provider Note       PCP: Kaiser Fontaine DO   Age: 70 y.o. Sex: female     DISPOSITION Decision To Discharge 11/25/2023 11:21:22 AM       ICD-10-CM    1. Bilateral hip pain  M25.551 oxyCODONE (ROXICODONE) 5 MG immediate release tablet    M25.552           Medical Decision Making     Complexity of Problems Addressed:  Complexity of Problem: 1 acute, uncomplicated illness or injury. Data Reviewed and Analyzed:  I independently ordered and reviewed each unique test.         I interpreted the X-rays. No acute fractures. Agree with radiologist impression. Discussion of management or test interpretation. Overall well-appearing 70-year-old female brought in by her daughter for complaint of bilateral hip pain after a fall that occurred about 2 weeks ago. Patient appears in no acute distress. Neurovascularly intact to lower extremities. She does appear to have weakness in lower extremities when I tried to have her stand. She did require a lot of assistance. Daughter reports that prior to the fall, she was able to dress herself and take herself to the bathroom at home but is now requiring a lot of help. Through shared decision making, will check x-ray and treat for acute pain. Imaging is negative for acute process. Feel patient would benefit from physical therapy to help reduce pain and improve ability to perform ADLs. Discussed this with patient's daughter and she agrees with this plan. Will provide prescriptions to help with acute pain but I have advised use of these medications with caution. Discussed increased risk of falling on these medications. Encouraged maximization of Tylenol to help reduce need for prescription pain medicine. Encouraged follow-up with PCP. Return precautions discussed.        Risk of Complications and/or Morbidity of Patient Management:  Prescription drug management performed and Shared medical decision making was utilized in creating the

## 2023-11-25 NOTE — ED TRIAGE NOTES
Patient reports x 1 month bilateral leg pain. Patient reports pain worse in left leg today with swelling. Patient reports falling twice about 2 weeks ago.

## 2023-11-25 NOTE — DISCHARGE INSTRUCTIONS
Her x-ray is normal. Continue giving tylenol 650 mg every 6 hours for pain and reserve prescription pain medication for more severe pain. Heat application for 10 to 15 minutes at a time a few times a day may also help alleviate pain. I recommend she start physical therapy. I have left her information for social work/case management. They should call you Monday to help get set up for physical therapy. Return to the emergency department for any new, worsening, or concerning symptoms.

## 2023-11-27 ENCOUNTER — CARE COORDINATION (OUTPATIENT)
Dept: CARE COORDINATION | Facility: CLINIC | Age: 78
End: 2023-11-27

## 2023-11-27 NOTE — CARE COORDINATION
Ambulatory Care Management Outreach Attempt    This patient was received as a referral from  Daily assignment for case management of ed utilizer. Attempted to reach patient for ED follow up. Unable to reach patient, LM with my contact information and will reach out again tomorrow.         Patient: Subhash Pack Patient :  MRN: [id number]    Last Discharge Facility       Date Complaint Diagnosis Description Type Department Provider    23 Leg Pain Bilateral hip pain ED (DISCHARGE) [facility]                 Noted following upcoming appointments from discharge chart review:   [facility] follow up appointment(s):   Future Appointments   Date Time Provider [address]   2024  9:00 AM Brock COTO DO PST [facility]     Non-BS follow up appointment(s):

## 2023-11-28 ENCOUNTER — CARE COORDINATION (OUTPATIENT)
Dept: CARE COORDINATION | Facility: CLINIC | Age: 78
End: 2023-11-28

## 2023-11-28 NOTE — CARE COORDINATION
2nd attempt to reach pt to offer ACM services. Pt has respectfully declined services at this time, my contact information has been shared with pt in the event there circumstances change. They are free to reach out at any time. ACM signing off.

## 2023-12-07 ENCOUNTER — HOSPITAL ENCOUNTER (EMERGENCY)
Age: 78
Discharge: HOME | End: 2023-12-07
Attending: EMERGENCY MEDICINE
Payer: MEDICARE

## 2023-12-07 ENCOUNTER — APPOINTMENT (OUTPATIENT)
Dept: CT IMAGING | Age: 78
End: 2023-12-07
Payer: MEDICARE

## 2023-12-07 VITALS
RESPIRATION RATE: 15 BRPM | HEART RATE: 83 BPM | SYSTOLIC BLOOD PRESSURE: 105 MMHG | OXYGEN SATURATION: 98 % | DIASTOLIC BLOOD PRESSURE: 62 MMHG | HEIGHT: 62 IN | BODY MASS INDEX: 30.36 KG/M2 | WEIGHT: 165 LBS | TEMPERATURE: 97.9 F

## 2023-12-07 DIAGNOSIS — K29.00 ACUTE GASTRITIS WITHOUT HEMORRHAGE, UNSPECIFIED GASTRITIS TYPE: Primary | ICD-10-CM

## 2023-12-07 DIAGNOSIS — R10.13 ABDOMINAL PAIN, EPIGASTRIC: ICD-10-CM

## 2023-12-07 DIAGNOSIS — M25.562 ACUTE PAIN OF LEFT KNEE: ICD-10-CM

## 2023-12-07 LAB
ALBUMIN SERPL-MCNC: 3.7 G/DL (ref 3.2–4.6)
ALBUMIN/GLOB SERPL: 1 (ref 0.4–1.6)
ALP SERPL-CCNC: 134 U/L (ref 50–136)
ALT SERPL-CCNC: 18 U/L (ref 12–65)
ANION GAP SERPL CALC-SCNC: 4 MMOL/L (ref 2–11)
APPEARANCE UR: CLEAR
AST SERPL-CCNC: 12 U/L (ref 15–37)
BACTERIA URNS QL MICRO: NEGATIVE /HPF
BASOPHILS # BLD: 0.1 K/UL (ref 0–0.2)
BASOPHILS NFR BLD: 1 % (ref 0–2)
BILIRUB SERPL-MCNC: 0.5 MG/DL (ref 0.2–1.1)
BILIRUB UR QL: NEGATIVE
BUN SERPL-MCNC: 22 MG/DL (ref 8–23)
CALCIUM SERPL-MCNC: 9.6 MG/DL (ref 8.3–10.4)
CASTS URNS QL MICRO: ABNORMAL /LPF
CHLORIDE SERPL-SCNC: 106 MMOL/L (ref 103–113)
CO2 SERPL-SCNC: 30 MMOL/L (ref 21–32)
COLOR UR: ABNORMAL
CREAT SERPL-MCNC: 1.25 MG/DL (ref 0.6–1)
D DIMER PPP FEU-MCNC: 1.14 UG/ML(FEU)
DIFFERENTIAL METHOD BLD: NORMAL
EOSINOPHIL # BLD: 0.2 K/UL (ref 0–0.8)
EOSINOPHIL NFR BLD: 2 % (ref 0.5–7.8)
EPI CELLS #/AREA URNS HPF: ABNORMAL /HPF
ERYTHROCYTE [DISTWIDTH] IN BLOOD BY AUTOMATED COUNT: 14.2 % (ref 11.9–14.6)
GLOBULIN SER CALC-MCNC: 3.6 G/DL (ref 2.8–4.5)
GLUCOSE SERPL-MCNC: 116 MG/DL (ref 65–100)
GLUCOSE UR STRIP.AUTO-MCNC: NEGATIVE MG/DL
HCT VFR BLD AUTO: 41.3 % (ref 35.8–46.3)
HGB BLD-MCNC: 13.7 G/DL (ref 11.7–15.4)
HGB UR QL STRIP: NEGATIVE
IMM GRANULOCYTES # BLD AUTO: 0 K/UL (ref 0–0.5)
IMM GRANULOCYTES NFR BLD AUTO: 0 % (ref 0–5)
KETONES UR QL STRIP.AUTO: NEGATIVE MG/DL
LEUKOCYTE ESTERASE UR QL STRIP.AUTO: ABNORMAL
LIPASE SERPL-CCNC: 115 U/L (ref 73–393)
LYMPHOCYTES # BLD: 2.5 K/UL (ref 0.5–4.6)
LYMPHOCYTES NFR BLD: 25 % (ref 13–44)
MCH RBC QN AUTO: 30.7 PG (ref 26.1–32.9)
MCHC RBC AUTO-ENTMCNC: 33.2 G/DL (ref 31.4–35)
MCV RBC AUTO: 92.6 FL (ref 82–102)
MONOCYTES # BLD: 0.7 K/UL (ref 0.1–1.3)
MONOCYTES NFR BLD: 7 % (ref 4–12)
NEUTS SEG # BLD: 6.3 K/UL (ref 1.7–8.2)
NEUTS SEG NFR BLD: 64 % (ref 43–78)
NITRITE UR QL STRIP.AUTO: NEGATIVE
NRBC # BLD: 0 K/UL (ref 0–0.2)
PH UR STRIP: 7 (ref 5–9)
PLATELET # BLD AUTO: 220 K/UL (ref 150–450)
PMV BLD AUTO: 9.6 FL (ref 9.4–12.3)
POTASSIUM SERPL-SCNC: 3.4 MMOL/L (ref 3.5–5.1)
PROT SERPL-MCNC: 7.3 G/DL (ref 6.3–8.2)
PROT UR STRIP-MCNC: NEGATIVE MG/DL
RBC # BLD AUTO: 4.46 M/UL (ref 4.05–5.2)
RBC #/AREA URNS HPF: ABNORMAL /HPF
SODIUM SERPL-SCNC: 140 MMOL/L (ref 136–146)
SP GR UR REFRACTOMETRY: 1.01 (ref 1–1.02)
UROBILINOGEN UR QL STRIP.AUTO: 0.2 EU/DL (ref 0.2–1)
WBC # BLD AUTO: 9.8 K/UL (ref 4.3–11.1)
WBC URNS QL MICRO: ABNORMAL /HPF

## 2023-12-07 PROCEDURE — 85379 FIBRIN DEGRADATION QUANT: CPT

## 2023-12-07 PROCEDURE — 83690 ASSAY OF LIPASE: CPT

## 2023-12-07 PROCEDURE — 80053 COMPREHEN METABOLIC PANEL: CPT

## 2023-12-07 PROCEDURE — A4216 STERILE WATER/SALINE, 10 ML: HCPCS | Performed by: EMERGENCY MEDICINE

## 2023-12-07 PROCEDURE — 74177 CT ABD & PELVIS W/CONTRAST: CPT

## 2023-12-07 PROCEDURE — 2500000003 HC RX 250 WO HCPCS: Performed by: EMERGENCY MEDICINE

## 2023-12-07 PROCEDURE — 6360000002 HC RX W HCPCS: Performed by: EMERGENCY MEDICINE

## 2023-12-07 PROCEDURE — 85025 COMPLETE CBC W/AUTO DIFF WBC: CPT

## 2023-12-07 PROCEDURE — 96374 THER/PROPH/DIAG INJ IV PUSH: CPT

## 2023-12-07 PROCEDURE — 71260 CT THORAX DX C+: CPT

## 2023-12-07 PROCEDURE — 99285 EMERGENCY DEPT VISIT HI MDM: CPT

## 2023-12-07 PROCEDURE — 81001 URINALYSIS AUTO W/SCOPE: CPT

## 2023-12-07 PROCEDURE — 96375 TX/PRO/DX INJ NEW DRUG ADDON: CPT

## 2023-12-07 PROCEDURE — 6360000004 HC RX CONTRAST MEDICATION: Performed by: EMERGENCY MEDICINE

## 2023-12-07 PROCEDURE — 2580000003 HC RX 258: Performed by: EMERGENCY MEDICINE

## 2023-12-07 RX ORDER — SUCRALFATE 1 G/1
1 TABLET ORAL 4 TIMES DAILY
Qty: 120 TABLET | Refills: 3 | Status: SHIPPED | OUTPATIENT
Start: 2023-12-07

## 2023-12-07 RX ORDER — ONDANSETRON 4 MG/1
4 TABLET, ORALLY DISINTEGRATING ORAL 3 TIMES DAILY PRN
Qty: 21 TABLET | Refills: 0 | Status: SHIPPED | OUTPATIENT
Start: 2023-12-07

## 2023-12-07 RX ORDER — MORPHINE SULFATE 2 MG/ML
2 INJECTION, SOLUTION INTRAMUSCULAR; INTRAVENOUS ONCE
Status: COMPLETED | OUTPATIENT
Start: 2023-12-07 | End: 2023-12-07

## 2023-12-07 RX ORDER — FAMOTIDINE 40 MG/1
20 TABLET, FILM COATED ORAL DAILY
Qty: 8 TABLET | Refills: 0 | Status: SHIPPED | OUTPATIENT
Start: 2023-12-07 | End: 2023-12-23

## 2023-12-07 RX ORDER — HYDROCODONE BITARTRATE AND ACETAMINOPHEN 5; 325 MG/1; MG/1
1 TABLET ORAL EVERY 6 HOURS PRN
Qty: 10 TABLET | Refills: 0 | Status: SHIPPED | OUTPATIENT
Start: 2023-12-07 | End: 2023-12-10

## 2023-12-07 RX ORDER — ONDANSETRON 2 MG/ML
4 INJECTION INTRAMUSCULAR; INTRAVENOUS
Status: COMPLETED | OUTPATIENT
Start: 2023-12-07 | End: 2023-12-07

## 2023-12-07 RX ADMIN — ONDANSETRON 4 MG: 2 INJECTION INTRAMUSCULAR; INTRAVENOUS at 02:51

## 2023-12-07 RX ADMIN — FAMOTIDINE 40 MG: 10 INJECTION, SOLUTION INTRAVENOUS at 05:26

## 2023-12-07 RX ADMIN — HYDROMORPHONE HYDROCHLORIDE 0.5 MG: 1 INJECTION, SOLUTION INTRAMUSCULAR; INTRAVENOUS; SUBCUTANEOUS at 05:25

## 2023-12-07 RX ADMIN — IOPAMIDOL 100 ML: 755 INJECTION, SOLUTION INTRAVENOUS at 03:43

## 2023-12-07 RX ADMIN — MORPHINE SULFATE 2 MG: 2 INJECTION, SOLUTION INTRAMUSCULAR; INTRAVENOUS at 02:53

## 2023-12-07 ASSESSMENT — ENCOUNTER SYMPTOMS
NAUSEA: 1
SHORTNESS OF BREATH: 0
BACK PAIN: 0
EYE PAIN: 0
EYE DISCHARGE: 0
ANAL BLEEDING: 0
BLOOD IN STOOL: 0
FACIAL SWELLING: 0
VOMITING: 1
COUGH: 0
RHINORRHEA: 0
DIARRHEA: 0
ABDOMINAL DISTENTION: 0
ABDOMINAL PAIN: 1
CONSTIPATION: 0
WHEEZING: 0
RECTAL PAIN: 0
STRIDOR: 0
EYE REDNESS: 0

## 2023-12-07 ASSESSMENT — LIFESTYLE VARIABLES
HOW OFTEN DO YOU HAVE A DRINK CONTAINING ALCOHOL: NEVER
HOW MANY STANDARD DRINKS CONTAINING ALCOHOL DO YOU HAVE ON A TYPICAL DAY: PATIENT DOES NOT DRINK

## 2023-12-07 ASSESSMENT — PAIN SCALES - GENERAL
PAINLEVEL_OUTOF10: 8
PAINLEVEL_OUTOF10: 10
PAINLEVEL_OUTOF10: 10

## 2023-12-07 ASSESSMENT — PAIN - FUNCTIONAL ASSESSMENT: PAIN_FUNCTIONAL_ASSESSMENT: 0-10

## 2023-12-07 ASSESSMENT — PAIN DESCRIPTION - LOCATION: LOCATION: ABDOMEN

## 2023-12-07 NOTE — ED TRIAGE NOTES
Pt arrives A&OX4 in wheelchair with family. Pt reports bilateral pain in hips/legs and RLQ of abdomen. Pt was seen here approx 2 weeks ago and has finished meds. Pt reports pain is unchanged from last visit despite meds. Pt cannot walk but is normally able to walk. Pt denies loss of bowel/bladder.

## 2023-12-07 NOTE — ED PROVIDER NOTES
atorvastatin (LIPITOR) 40 MG tablet TAKE ONE TABLET BY MOUTH ONE TIME DAILY, Disp-90 tablet, R-3Normal      levothyroxine (SYNTHROID) 50 MCG tablet Take 1 tablet by mouth daily, Disp-30 tablet, R-1Normal      amitriptyline (ELAVIL) 25 MG tablet Take 1 tablet by mouth daily, Disp-90 tablet, R-3Normal      sertraline (ZOLOFT) 100 MG tablet TAKE ONE TABLET BY MOUTH ONE TIME DAILY, Disp-90 tablet, R-3Normal      traZODone (DESYREL) 50 MG tablet TAKE ONE TABLET BY MOUTH EVERY NIGHT, Disp-90 tablet, R-3Normal      omeprazole (PRILOSEC) 20 MG delayed release capsule TAKE ONE CAPSULE BY MOUTH ONE TIME DAILY, Disp-90 capsule, R-3Normal      amLODIPine (NORVASC) 5 MG tablet Take 1 tablet by mouth daily TAKE ONE TABLET BY MOUTH ONE TIME DAILY, Disp-90 tablet, R-3Normal      fluticasone (FLONASE) 50 MCG/ACT nasal spray 2 sprays by Nasal route daily, Disp-16 g, R-3Normal      LORazepam (ATIVAN) 1 MG tablet Historical Med      gabapentin (NEURONTIN) 300 MG capsule TAKE ONE CAPSULE BY MOUTH THREE TIMES A DAYHistorical Med              Results for orders placed or performed during the hospital encounter of 12/07/23   CT CHEST PULMONARY EMBOLISM W CONTRAST    Narrative    EXAMINATION:    CTA CHEST     CLINICAL HISTORY:    Chest pain, shortness of breath    COMPARISON:  There is no prior similar study available for correlation. TECHNIQUE: Axial imaging along with 2-D and 3-D postprocessing performed from  the level of the thoracic inlet to the kidneys following the administration of  IV contrast and utilizing CTA protocol. This exam was performed according to  our departmental dose-optimization program, which includes automated exposure  control, adjustment of the mA and/or kV according to patient size and/or use of  iterative reconstruction technique. FINDINGS:    No pulmonary artery filling defects are identified to indicate pulmonary  embolism. The heart is normal in size. There is no pericardial effusion.  The

## 2023-12-30 ENCOUNTER — APPOINTMENT (OUTPATIENT)
Dept: GENERAL RADIOLOGY | Age: 78
DRG: 690 | End: 2023-12-30
Payer: MEDICARE

## 2023-12-30 ENCOUNTER — HOSPITAL ENCOUNTER (INPATIENT)
Age: 78
LOS: 1 days | Discharge: ANOTHER HOSPITAL | DRG: 690 | End: 2023-12-31
Attending: EMERGENCY MEDICINE | Admitting: FAMILY MEDICINE
Payer: MEDICARE

## 2023-12-30 ENCOUNTER — APPOINTMENT (OUTPATIENT)
Dept: CT IMAGING | Age: 78
DRG: 690 | End: 2023-12-30
Payer: MEDICARE

## 2023-12-30 ENCOUNTER — APPOINTMENT (OUTPATIENT)
Dept: MRI IMAGING | Age: 78
DRG: 690 | End: 2023-12-30
Payer: MEDICARE

## 2023-12-30 DIAGNOSIS — R29.898 BILATERAL LEG WEAKNESS: Primary | ICD-10-CM

## 2023-12-30 DIAGNOSIS — R26.2 INABILITY TO WALK: ICD-10-CM

## 2023-12-30 DIAGNOSIS — N39.0 ACUTE UTI: ICD-10-CM

## 2023-12-30 LAB
25(OH)D3 SERPL-MCNC: 30.9 NG/ML (ref 30–100)
ALBUMIN SERPL-MCNC: 3.4 G/DL (ref 3.2–4.6)
ALBUMIN/GLOB SERPL: 1 (ref 0.4–1.6)
ALP SERPL-CCNC: 98 U/L (ref 50–136)
ALT SERPL-CCNC: 16 U/L (ref 12–65)
ANION GAP SERPL CALC-SCNC: 6 MMOL/L (ref 2–11)
APPEARANCE UR: ABNORMAL
AST SERPL-CCNC: 13 U/L (ref 15–37)
BACTERIA URNS QL MICRO: ABNORMAL /HPF
BASOPHILS # BLD: 0.1 K/UL (ref 0–0.2)
BASOPHILS NFR BLD: 1 % (ref 0–2)
BILIRUB SERPL-MCNC: 0.7 MG/DL (ref 0.2–1.1)
BILIRUB UR QL: NEGATIVE
BUN SERPL-MCNC: 27 MG/DL (ref 8–23)
CALCIUM SERPL-MCNC: 10.3 MG/DL (ref 8.3–10.4)
CHLORIDE SERPL-SCNC: 110 MMOL/L (ref 103–113)
CO2 SERPL-SCNC: 26 MMOL/L (ref 21–32)
COLOR UR: ABNORMAL
CREAT SERPL-MCNC: 1.27 MG/DL (ref 0.6–1)
CRP SERPL-MCNC: 1.2 MG/DL (ref 0–0.9)
CRYSTALS URNS QL MICRO: ABNORMAL /LPF
DIFFERENTIAL METHOD BLD: ABNORMAL
EOSINOPHIL # BLD: 0.1 K/UL (ref 0–0.8)
EOSINOPHIL NFR BLD: 1 % (ref 0.5–7.8)
ERYTHROCYTE [DISTWIDTH] IN BLOOD BY AUTOMATED COUNT: 14.1 % (ref 11.9–14.6)
FOLATE SERPL-MCNC: 13 NG/ML (ref 3.1–17.5)
GLOBULIN SER CALC-MCNC: 3.5 G/DL (ref 2.8–4.5)
GLUCOSE SERPL-MCNC: 112 MG/DL (ref 65–100)
GLUCOSE UR STRIP.AUTO-MCNC: NEGATIVE MG/DL
HCT VFR BLD AUTO: 44.7 % (ref 35.8–46.3)
HGB BLD-MCNC: 15.5 G/DL (ref 11.7–15.4)
HGB UR QL STRIP: NEGATIVE
IMM GRANULOCYTES # BLD AUTO: 0 K/UL (ref 0–0.5)
IMM GRANULOCYTES NFR BLD AUTO: 0 % (ref 0–5)
KETONES UR QL STRIP.AUTO: NEGATIVE MG/DL
LEUKOCYTE ESTERASE UR QL STRIP.AUTO: ABNORMAL
LYMPHOCYTES # BLD: 2.4 K/UL (ref 0.5–4.6)
LYMPHOCYTES NFR BLD: 30 % (ref 13–44)
MCH RBC QN AUTO: 31.1 PG (ref 26.1–32.9)
MCHC RBC AUTO-ENTMCNC: 34.7 G/DL (ref 31.4–35)
MCV RBC AUTO: 89.6 FL (ref 82–102)
MONOCYTES # BLD: 0.5 K/UL (ref 0.1–1.3)
MONOCYTES NFR BLD: 6 % (ref 4–12)
NEUTS SEG # BLD: 4.9 K/UL (ref 1.7–8.2)
NEUTS SEG NFR BLD: 62 % (ref 43–78)
NITRITE UR QL STRIP.AUTO: POSITIVE
NRBC # BLD: 0 K/UL (ref 0–0.2)
PH UR STRIP: >=9 (ref 5–9)
PLATELET # BLD AUTO: 244 K/UL (ref 150–450)
PMV BLD AUTO: 9.4 FL (ref 9.4–12.3)
POTASSIUM SERPL-SCNC: 3.7 MMOL/L (ref 3.5–5.1)
PROT SERPL-MCNC: 6.9 G/DL (ref 6.3–8.2)
PROT UR STRIP-MCNC: 100 MG/DL
RBC # BLD AUTO: 4.99 M/UL (ref 4.05–5.2)
SODIUM SERPL-SCNC: 142 MMOL/L (ref 136–146)
SP GR UR REFRACTOMETRY: 1.02 (ref 1–1.02)
T4 FREE SERPL-MCNC: 1.4 NG/DL (ref 0.78–1.46)
TSH W FREE THYROID IF ABNORMAL: 6.52 UIU/ML (ref 0.36–3.74)
UROBILINOGEN UR QL STRIP.AUTO: 0.2 EU/DL (ref 0.2–1)
VIT B12 SERPL-MCNC: 1256 PG/ML (ref 193–986)
WBC # BLD AUTO: 7.9 K/UL (ref 4.3–11.1)
WBC URNS QL MICRO: ABNORMAL /HPF

## 2023-12-30 PROCEDURE — 2580000003 HC RX 258: Performed by: FAMILY MEDICINE

## 2023-12-30 PROCEDURE — 72131 CT LUMBAR SPINE W/O DYE: CPT

## 2023-12-30 PROCEDURE — 87088 URINE BACTERIA CULTURE: CPT

## 2023-12-30 PROCEDURE — 96374 THER/PROPH/DIAG INJ IV PUSH: CPT

## 2023-12-30 PROCEDURE — 82607 VITAMIN B-12: CPT

## 2023-12-30 PROCEDURE — 6360000002 HC RX W HCPCS: Performed by: FAMILY MEDICINE

## 2023-12-30 PROCEDURE — A9579 GAD-BASE MR CONTRAST NOS,1ML: HCPCS | Performed by: EMERGENCY MEDICINE

## 2023-12-30 PROCEDURE — 6370000000 HC RX 637 (ALT 250 FOR IP): Performed by: FAMILY MEDICINE

## 2023-12-30 PROCEDURE — 71045 X-RAY EXAM CHEST 1 VIEW: CPT

## 2023-12-30 PROCEDURE — 80053 COMPREHEN METABOLIC PANEL: CPT

## 2023-12-30 PROCEDURE — 72158 MRI LUMBAR SPINE W/O & W/DYE: CPT

## 2023-12-30 PROCEDURE — 86140 C-REACTIVE PROTEIN: CPT

## 2023-12-30 PROCEDURE — 1100000000 HC RM PRIVATE

## 2023-12-30 PROCEDURE — 82746 ASSAY OF FOLIC ACID SERUM: CPT

## 2023-12-30 PROCEDURE — 84443 ASSAY THYROID STIM HORMONE: CPT

## 2023-12-30 PROCEDURE — 84439 ASSAY OF FREE THYROXINE: CPT

## 2023-12-30 PROCEDURE — 85025 COMPLETE CBC W/AUTO DIFF WBC: CPT

## 2023-12-30 PROCEDURE — 99285 EMERGENCY DEPT VISIT HI MDM: CPT

## 2023-12-30 PROCEDURE — 81001 URINALYSIS AUTO W/SCOPE: CPT

## 2023-12-30 PROCEDURE — 2580000003 HC RX 258: Performed by: EMERGENCY MEDICINE

## 2023-12-30 PROCEDURE — 82306 VITAMIN D 25 HYDROXY: CPT

## 2023-12-30 PROCEDURE — 87086 URINE CULTURE/COLONY COUNT: CPT

## 2023-12-30 PROCEDURE — 6360000004 HC RX CONTRAST MEDICATION: Performed by: EMERGENCY MEDICINE

## 2023-12-30 PROCEDURE — 87186 SC STD MICRODIL/AGAR DIL: CPT

## 2023-12-30 PROCEDURE — 6360000002 HC RX W HCPCS: Performed by: EMERGENCY MEDICINE

## 2023-12-30 RX ORDER — ACETAMINOPHEN 650 MG/1
650 SUPPOSITORY RECTAL EVERY 6 HOURS PRN
Status: DISCONTINUED | OUTPATIENT
Start: 2023-12-30 | End: 2023-12-31 | Stop reason: HOSPADM

## 2023-12-30 RX ORDER — MAGNESIUM SULFATE IN WATER 40 MG/ML
2000 INJECTION, SOLUTION INTRAVENOUS PRN
Status: DISCONTINUED | OUTPATIENT
Start: 2023-12-30 | End: 2023-12-31 | Stop reason: HOSPADM

## 2023-12-30 RX ORDER — POTASSIUM CHLORIDE 20 MEQ/1
40 TABLET, EXTENDED RELEASE ORAL PRN
Status: DISCONTINUED | OUTPATIENT
Start: 2023-12-30 | End: 2023-12-31 | Stop reason: HOSPADM

## 2023-12-30 RX ORDER — ACETAMINOPHEN 325 MG/1
650 TABLET ORAL EVERY 6 HOURS PRN
Status: DISCONTINUED | OUTPATIENT
Start: 2023-12-30 | End: 2023-12-31 | Stop reason: HOSPADM

## 2023-12-30 RX ORDER — ONDANSETRON 2 MG/ML
4 INJECTION INTRAMUSCULAR; INTRAVENOUS EVERY 6 HOURS PRN
Status: DISCONTINUED | OUTPATIENT
Start: 2023-12-30 | End: 2023-12-31 | Stop reason: HOSPADM

## 2023-12-30 RX ORDER — TRAZODONE HYDROCHLORIDE 50 MG/1
50 TABLET ORAL NIGHTLY
Status: DISCONTINUED | OUTPATIENT
Start: 2023-12-30 | End: 2023-12-31 | Stop reason: HOSPADM

## 2023-12-30 RX ORDER — SODIUM CHLORIDE 9 MG/ML
INJECTION, SOLUTION INTRAVENOUS PRN
Status: DISCONTINUED | OUTPATIENT
Start: 2023-12-30 | End: 2023-12-31 | Stop reason: HOSPADM

## 2023-12-30 RX ORDER — ENOXAPARIN SODIUM 100 MG/ML
40 INJECTION SUBCUTANEOUS DAILY
Status: DISCONTINUED | OUTPATIENT
Start: 2023-12-30 | End: 2023-12-31 | Stop reason: HOSPADM

## 2023-12-30 RX ORDER — LEVOTHYROXINE SODIUM 0.05 MG/1
50 TABLET ORAL DAILY
Status: DISCONTINUED | OUTPATIENT
Start: 2023-12-31 | End: 2023-12-31 | Stop reason: HOSPADM

## 2023-12-30 RX ORDER — SODIUM CHLORIDE 0.9 % (FLUSH) 0.9 %
5-40 SYRINGE (ML) INJECTION EVERY 12 HOURS SCHEDULED
Status: DISCONTINUED | OUTPATIENT
Start: 2023-12-30 | End: 2023-12-31 | Stop reason: HOSPADM

## 2023-12-30 RX ORDER — ONDANSETRON 4 MG/1
4 TABLET, ORALLY DISINTEGRATING ORAL EVERY 8 HOURS PRN
Status: DISCONTINUED | OUTPATIENT
Start: 2023-12-30 | End: 2023-12-31 | Stop reason: HOSPADM

## 2023-12-30 RX ORDER — ALPRAZOLAM 1 MG/1
1 TABLET ORAL NIGHTLY PRN
COMMUNITY

## 2023-12-30 RX ORDER — SODIUM CHLORIDE 0.9 % (FLUSH) 0.9 %
5-40 SYRINGE (ML) INJECTION PRN
Status: DISCONTINUED | OUTPATIENT
Start: 2023-12-30 | End: 2023-12-31 | Stop reason: HOSPADM

## 2023-12-30 RX ORDER — AMITRIPTYLINE HYDROCHLORIDE 25 MG/1
25 TABLET, FILM COATED ORAL DAILY
Status: DISCONTINUED | OUTPATIENT
Start: 2023-12-31 | End: 2023-12-31 | Stop reason: HOSPADM

## 2023-12-30 RX ORDER — FLUTICASONE PROPIONATE 50 MCG
2 SPRAY, SUSPENSION (ML) NASAL DAILY
Status: DISCONTINUED | OUTPATIENT
Start: 2023-12-31 | End: 2023-12-31 | Stop reason: HOSPADM

## 2023-12-30 RX ORDER — HYDROCODONE BITARTRATE AND ACETAMINOPHEN 5; 325 MG/1; MG/1
1 TABLET ORAL EVERY 6 HOURS PRN
Status: DISCONTINUED | OUTPATIENT
Start: 2023-12-30 | End: 2023-12-31 | Stop reason: HOSPADM

## 2023-12-30 RX ORDER — ATORVASTATIN CALCIUM 40 MG/1
40 TABLET, FILM COATED ORAL DAILY
Status: DISCONTINUED | OUTPATIENT
Start: 2023-12-30 | End: 2023-12-31 | Stop reason: HOSPADM

## 2023-12-30 RX ORDER — 0.9 % SODIUM CHLORIDE 0.9 %
1000 INTRAVENOUS SOLUTION INTRAVENOUS ONCE
Status: COMPLETED | OUTPATIENT
Start: 2023-12-30 | End: 2023-12-30

## 2023-12-30 RX ORDER — TRAMADOL HYDROCHLORIDE 50 MG/1
50 TABLET ORAL 2 TIMES DAILY PRN
Status: DISCONTINUED | OUTPATIENT
Start: 2023-12-30 | End: 2023-12-31 | Stop reason: HOSPADM

## 2023-12-30 RX ORDER — HYDROCODONE BITARTRATE AND ACETAMINOPHEN 5; 325 MG/1; MG/1
1 TABLET ORAL EVERY 6 HOURS PRN
Status: ON HOLD | COMMUNITY
End: 2024-01-04

## 2023-12-30 RX ORDER — POLYETHYLENE GLYCOL 3350 17 G/17G
17 POWDER, FOR SOLUTION ORAL DAILY PRN
Status: DISCONTINUED | OUTPATIENT
Start: 2023-12-30 | End: 2023-12-31 | Stop reason: HOSPADM

## 2023-12-30 RX ORDER — ALPRAZOLAM 0.5 MG/1
0.5 TABLET ORAL 2 TIMES DAILY PRN
Status: DISCONTINUED | OUTPATIENT
Start: 2023-12-30 | End: 2023-12-31 | Stop reason: HOSPADM

## 2023-12-30 RX ORDER — PANTOPRAZOLE SODIUM 40 MG/1
40 TABLET, DELAYED RELEASE ORAL
Status: DISCONTINUED | OUTPATIENT
Start: 2023-12-30 | End: 2023-12-31 | Stop reason: HOSPADM

## 2023-12-30 RX ORDER — POTASSIUM CHLORIDE 7.45 MG/ML
10 INJECTION INTRAVENOUS PRN
Status: DISCONTINUED | OUTPATIENT
Start: 2023-12-30 | End: 2023-12-31 | Stop reason: HOSPADM

## 2023-12-30 RX ORDER — PREGABALIN 75 MG/1
75 CAPSULE ORAL 2 TIMES DAILY
Status: ON HOLD | COMMUNITY
Start: 2023-11-07 | End: 2024-01-04 | Stop reason: HOSPADM

## 2023-12-30 RX ORDER — AMLODIPINE BESYLATE 5 MG/1
5 TABLET ORAL DAILY
Status: DISCONTINUED | OUTPATIENT
Start: 2023-12-30 | End: 2023-12-31 | Stop reason: HOSPADM

## 2023-12-30 RX ORDER — SUCRALFATE 1 G/1
1 TABLET ORAL 4 TIMES DAILY
Status: DISCONTINUED | OUTPATIENT
Start: 2023-12-30 | End: 2023-12-31 | Stop reason: HOSPADM

## 2023-12-30 RX ADMIN — WATER 1000 MG: 1 INJECTION INTRAMUSCULAR; INTRAVENOUS; SUBCUTANEOUS at 05:39

## 2023-12-30 RX ADMIN — TRAMADOL HYDROCHLORIDE 50 MG: 50 TABLET ORAL at 09:09

## 2023-12-30 RX ADMIN — SUCRALFATE 1 G: 1 TABLET ORAL at 21:01

## 2023-12-30 RX ADMIN — TRAMADOL HYDROCHLORIDE 50 MG: 50 TABLET ORAL at 21:00

## 2023-12-30 RX ADMIN — ENOXAPARIN SODIUM 40 MG: 100 INJECTION SUBCUTANEOUS at 15:09

## 2023-12-30 RX ADMIN — SODIUM CHLORIDE 1000 ML: 9 INJECTION, SOLUTION INTRAVENOUS at 05:38

## 2023-12-30 RX ADMIN — TRAZODONE HYDROCHLORIDE 50 MG: 50 TABLET ORAL at 21:01

## 2023-12-30 RX ADMIN — SODIUM CHLORIDE, PRESERVATIVE FREE 10 ML: 5 INJECTION INTRAVENOUS at 21:03

## 2023-12-30 RX ADMIN — SUCRALFATE 1 G: 1 TABLET ORAL at 15:09

## 2023-12-30 RX ADMIN — ATORVASTATIN CALCIUM 40 MG: 40 TABLET, FILM COATED ORAL at 09:09

## 2023-12-30 RX ADMIN — GADOTERIDOL 14 ML: 279.3 INJECTION, SOLUTION INTRAVENOUS at 10:38

## 2023-12-30 RX ADMIN — AMLODIPINE BESYLATE 5 MG: 5 TABLET ORAL at 09:09

## 2023-12-30 RX ADMIN — HYDROCODONE BITARTRATE AND ACETAMINOPHEN 1 TABLET: 5; 325 TABLET ORAL at 15:09

## 2023-12-30 RX ADMIN — SUCRALFATE 1 G: 1 TABLET ORAL at 09:09

## 2023-12-30 RX ADMIN — PANTOPRAZOLE SODIUM 40 MG: 40 TABLET, DELAYED RELEASE ORAL at 11:55

## 2023-12-30 ASSESSMENT — PAIN - FUNCTIONAL ASSESSMENT
PAIN_FUNCTIONAL_ASSESSMENT: PREVENTS OR INTERFERES SOME ACTIVE ACTIVITIES AND ADLS
PAIN_FUNCTIONAL_ASSESSMENT: 0-10

## 2023-12-30 ASSESSMENT — PAIN DESCRIPTION - ORIENTATION: ORIENTATION: RIGHT;LEFT

## 2023-12-30 ASSESSMENT — PAIN DESCRIPTION - LOCATION: LOCATION: LEG

## 2023-12-30 ASSESSMENT — PAIN DESCRIPTION - DESCRIPTORS: DESCRIPTORS: ACHING

## 2023-12-30 ASSESSMENT — PAIN SCALES - GENERAL
PAINLEVEL_OUTOF10: 10
PAINLEVEL_OUTOF10: 10
PAINLEVEL_OUTOF10: 6

## 2023-12-30 NOTE — H&P
obtain.      Assessment & Plan:     Lower extremity weakness  Chronic pain syndrome  Frequent falls  Follows pain management.  Per chart review patient has significant postherpetic neuralgia difficult to control.  CT C/A/P done 12/7/2023 in ED negative for lymphoma.  On last OV with hematology 12/15/2023 patient advised to stop Lyrica due to frequent falls  CT L-spine on admission shows no evidence of fracture or subluxation; multilevel degenerative changes.  Fall precautions  Supportive care  Analgesics as needed --Cont home tramadol 50mg BID prn--verified via [facility] scripts); add on Norco 5mg TID prn   PT/OT to evaluate  MRI L-spine ordered--pending  Check TSH, vitamin D, B12/folate    Acute UTI  No previous urine culture seen.  No signs of sepsis on admission  ABX: Rocephin empirically (12/30-…)  Urine culture: Pending    Diffuse large B-cell lymphoma  Followed [facility], diagnosed in 12/22.  Completed chemo.  Follow-up with oncology outpatient    CKD 3  Creatinine baseline~1.3-1.4  Avoid nephrotoxic meds  CTM BMP    Depression/anxiety  Follows psychiatry outpatient  Continue home trazodone, Zoloft, amitriptyline  Cont home Xanax but at decreased dose 0.5mg BID prn (1mg BID prn--verified via scripts)    GERD  Continue home PPI and Carafate    Hypothyroidism  Continue home Synthroid    HTN  Continue home Norvasc    HLD  Continue home atorvastatin    PT/OT evals and PPD needed/ordered?  Yes  Diet: No diet orders on file  VTE prophylaxis: Lovenox  Code status: No Order      Non-peripheral Lines and Tubes (if present):             Hospital Problems:  Active Problems:    * No active hospital problems. *  Resolved Problems:    * No resolved hospital problems. *      Past History:     Past Medical History:   Diagnosis Date    Anxiety     Depression     Hypercholesterolemia     Hypertension     Thyroid disease        Past Surgical History:   Procedure Laterality Date    US BREAST BIOPSY W LOC DEVICE 1ST LESION

## 2023-12-30 NOTE — ED NOTES
TRANSFER - OUT REPORT:    Verbal report given to Stephania BHATT on Imelda Coombs  being transferred to On license of [facility] for routine progression of patient care       Report consisted of patient's Situation, Background, Assessment and   Recommendations(SBAR).     Information from the following report(s) Nurse Handoff Report, ED Encounter Summary, ED SBAR, Adult Overview, MAR, Recent Results, Quality Measures, and Neuro Assessment was reviewed with the receiving nurse.    [place] Fall Assessment:    Presents to emergency department  because of falls (Syncope, seizure, or loss of consciousness): No  Age > 70: Yes  Altered Mental Status, Intoxication with alcohol or substance confusion (Disorientation, impaired judgment, poor safety awaremess, or inability to follow instructions): No  Impaired Mobility: Ambulates or transfers with assistive devices or assistance; Unable to ambulate or transer.: Yes  Nursing Judgement: No          Lines:   Single Lumen Implantable Port Right Subclavian (Active)       Peripheral IV 12/30/23 Left Antecubital (Active)   Site Assessment Clean, dry & intact 12/30/23 0426   Line Status Blood return noted;Brisk blood return;Flushed 12/30/23 0426   Phlebitis Assessment No symptoms 12/30/23 0426   Infiltration Assessment 0 12/30/23 0426   Dressing Status New dressing applied 12/30/23 0426   Dressing Type Transparent 12/30/23 0426        Opportunity for questions and clarification was provided.      Patient transported with:  Registered Nurse

## 2023-12-30 NOTE — ED PROVIDER NOTES
9.0      Protein,  (A) NEG mg/dL    Glucose, UA Negative mg/dL    Ketones, Urine Negative NEG mg/dL    Bilirubin Urine Negative NEG      Blood, Urine Negative NEG      Urobilinogen, Urine 0.2 0.2 - 1.0 EU/dL    Nitrite, Urine Positive (A) NEG      Leukocyte Esterase, Urine LARGE (A) NEG     C-Reactive Protein   Result Value Ref Range    CRP 1.2 (H) 0.0 - 0.9 mg/dL        CT LUMBAR SPINE WO CONTRAST   Final Result   No evidence of acute fracture or subluxation.      Degenerative changes.      MRI LUMBAR SPINE W WO CONTRAST    (Results Pending)                     Voice dictation software was used during the making of this note.  This software is not perfect and grammatical and other typographical errors may be present.  This note has not been completely proofread for errors.     Josr Pardo Jr., MD  12/30/23 0774

## 2023-12-30 NOTE — ED TRIAGE NOTES
Per EMS: c/o bilateral LE and lower back pain for a month, pain has worsened over the last week; pt has been able to walk to restroom at home. Per family: desire pain control. Pt visibly tearful and worried. Hx of HTN - 166/86, tachy at 105.

## 2023-12-31 ENCOUNTER — APPOINTMENT (OUTPATIENT)
Dept: INTERVENTIONAL RADIOLOGY/VASCULAR | Age: 78
DRG: 841 | End: 2023-12-31
Attending: STUDENT IN AN ORGANIZED HEALTH CARE EDUCATION/TRAINING PROGRAM
Payer: MEDICARE

## 2023-12-31 ENCOUNTER — HOSPITAL ENCOUNTER (INPATIENT)
Age: 78
LOS: 4 days | Discharge: HOME | DRG: 841 | End: 2024-01-04
Attending: STUDENT IN AN ORGANIZED HEALTH CARE EDUCATION/TRAINING PROGRAM | Admitting: INTERNAL MEDICINE
Payer: MEDICARE

## 2023-12-31 VITALS
OXYGEN SATURATION: 100 % | TEMPERATURE: 98.1 F | RESPIRATION RATE: 16 BRPM | BODY MASS INDEX: 30.18 KG/M2 | HEART RATE: 105 BPM | SYSTOLIC BLOOD PRESSURE: 146 MMHG | HEIGHT: 62 IN | DIASTOLIC BLOOD PRESSURE: 79 MMHG

## 2023-12-31 DIAGNOSIS — G95.89 MASS OF SPINAL CORD (HCC): Primary | ICD-10-CM

## 2023-12-31 PROBLEM — C83.30 DIFFUSE LARGE B-CELL LYMPHOMA, UNSPECIFIED BODY REGION (HCC): Status: RESOLVED | Noted: 2023-01-27 | Resolved: 2023-12-31

## 2023-12-31 PROBLEM — N39.0 ACUTE UTI: Status: ACTIVE | Noted: 2023-12-31

## 2023-12-31 LAB
ANION GAP SERPL CALC-SCNC: 9 MMOL/L (ref 2–11)
APPEARANCE FLD: CLEAR
BASOPHILS # BLD: 0.1 K/UL (ref 0–0.2)
BASOPHILS NFR BLD: 1 % (ref 0–2)
BUN SERPL-MCNC: 25 MG/DL (ref 8–23)
CALCIUM SERPL-MCNC: 9.5 MG/DL (ref 8.3–10.4)
CHLORIDE SERPL-SCNC: 111 MMOL/L (ref 103–113)
CO2 SERPL-SCNC: 23 MMOL/L (ref 21–32)
COLOR FLD: YELLOW
CREAT SERPL-MCNC: 1.31 MG/DL (ref 0.6–1)
DIFFERENTIAL METHOD BLD: NORMAL
EOSINOPHIL # BLD: 0.2 K/UL (ref 0–0.8)
EOSINOPHIL NFR BLD: 4 % (ref 0.5–7.8)
ERYTHROCYTE [DISTWIDTH] IN BLOOD BY AUTOMATED COUNT: 14.6 % (ref 11.9–14.6)
GLUCOSE CSF-MCNC: 35 MG/DL (ref 50–70)
GLUCOSE SERPL-MCNC: 106 MG/DL (ref 65–100)
HCT VFR BLD AUTO: 41 % (ref 35.8–46.3)
HGB BLD-MCNC: 13.7 G/DL (ref 11.7–15.4)
IMM GRANULOCYTES # BLD AUTO: 0 K/UL (ref 0–0.5)
IMM GRANULOCYTES NFR BLD AUTO: 0 % (ref 0–5)
LDH SERPL L TO P-CCNC: 195 U/L (ref 110–210)
LYMPHOCYTES # BLD: 1.8 K/UL (ref 0.5–4.6)
LYMPHOCYTES NFR BLD: 30 % (ref 13–44)
LYMPHOCYTES NFR BRONCH MANUAL: 75 %
MACROPHAGES NFR BRONCH MANUAL: 16 %
MCH RBC QN AUTO: 30.7 PG (ref 26.1–32.9)
MCHC RBC AUTO-ENTMCNC: 33.4 G/DL (ref 31.4–35)
MCV RBC AUTO: 91.9 FL (ref 82–102)
MONOCYTES # BLD: 0.6 K/UL (ref 0.1–1.3)
MONOCYTES NFR BLD: 10 % (ref 4–12)
NEUTROPHILS NFR BRONCH MANUAL: 9 %
NEUTS SEG # BLD: 3.2 K/UL (ref 1.7–8.2)
NEUTS SEG NFR BLD: 54 % (ref 43–78)
NRBC # BLD: 0 K/UL (ref 0–0.2)
NUC CELL # FLD: 63 /CU MM
PLATELET # BLD AUTO: 233 K/UL (ref 150–450)
PMV BLD AUTO: 9.6 FL (ref 9.4–12.3)
POTASSIUM SERPL-SCNC: 3.7 MMOL/L (ref 3.5–5.1)
PROT CSF-MCNC: 384 MG/DL (ref 15–45)
RBC # BLD AUTO: 4.46 M/UL (ref 4.05–5.2)
RBC # FLD: 10 /CU MM
SODIUM SERPL-SCNC: 143 MMOL/L (ref 136–146)
SPECIMEN SOURCE FLD: NORMAL
TUBE # CSF: ABNORMAL
TUBE # CSF: ABNORMAL
URATE SERPL-MCNC: 4.6 MG/DL (ref 2.6–6)
WBC # BLD AUTO: 6 K/UL (ref 4.3–11.1)

## 2023-12-31 PROCEDURE — 85025 COMPLETE CBC W/AUTO DIFF WBC: CPT

## 2023-12-31 PROCEDURE — 83615 LACTATE (LD) (LDH) ENZYME: CPT

## 2023-12-31 PROCEDURE — 6360000002 HC RX W HCPCS: Performed by: FAMILY MEDICINE

## 2023-12-31 PROCEDURE — 2580000003 HC RX 258: Performed by: FAMILY MEDICINE

## 2023-12-31 PROCEDURE — 84550 ASSAY OF BLOOD/URIC ACID: CPT

## 2023-12-31 PROCEDURE — 36415 COLL VENOUS BLD VENIPUNCTURE: CPT

## 2023-12-31 PROCEDURE — 87070 CULTURE OTHR SPECIMN AEROBIC: CPT

## 2023-12-31 PROCEDURE — 2709999900 IR LUMBAR PUNCTURE FOR DIAGNOSIS

## 2023-12-31 PROCEDURE — 80048 BASIC METABOLIC PNL TOTAL CA: CPT

## 2023-12-31 PROCEDURE — 88185 FLOWCYTOMETRY/TC ADD-ON: CPT

## 2023-12-31 PROCEDURE — 82945 GLUCOSE OTHER FLUID: CPT

## 2023-12-31 PROCEDURE — 6370000000 HC RX 637 (ALT 250 FOR IP): Performed by: FAMILY MEDICINE

## 2023-12-31 PROCEDURE — 87483 CNS DNA AMP PROBE TYPE 12-25: CPT

## 2023-12-31 PROCEDURE — 62328 DX LMBR SPI PNXR W/FLUOR/CT: CPT

## 2023-12-31 PROCEDURE — 62328 DX LMBR SPI PNXR W/FLUOR/CT: CPT | Performed by: RADIOLOGY

## 2023-12-31 PROCEDURE — 89050 BODY FLUID CELL COUNT: CPT

## 2023-12-31 PROCEDURE — 88184 FLOWCYTOMETRY/ TC 1 MARKER: CPT

## 2023-12-31 PROCEDURE — 1100000000 HC RM PRIVATE

## 2023-12-31 PROCEDURE — 2500000003 HC RX 250 WO HCPCS: Performed by: RADIOLOGY

## 2023-12-31 PROCEDURE — 99223 1ST HOSP IP/OBS HIGH 75: CPT | Performed by: INTERNAL MEDICINE

## 2023-12-31 PROCEDURE — 009U3ZX DRAINAGE OF SPINAL CANAL, PERCUTANEOUS APPROACH, DIAGNOSTIC: ICD-10-PCS | Performed by: RADIOLOGY

## 2023-12-31 PROCEDURE — 87205 SMEAR GRAM STAIN: CPT

## 2023-12-31 PROCEDURE — 84157 ASSAY OF PROTEIN OTHER: CPT

## 2023-12-31 PROCEDURE — 6360000002 HC RX W HCPCS: Performed by: INTERNAL MEDICINE

## 2023-12-31 RX ORDER — AMLODIPINE BESYLATE 5 MG/1
5 TABLET ORAL DAILY
Status: CANCELLED | OUTPATIENT
Start: 2024-01-01

## 2023-12-31 RX ORDER — SERTRALINE HYDROCHLORIDE 100 MG/1
100 TABLET, FILM COATED ORAL DAILY
Status: DISCONTINUED | OUTPATIENT
Start: 2024-01-01 | End: 2024-01-04 | Stop reason: HOSPADM

## 2023-12-31 RX ORDER — TRAMADOL HYDROCHLORIDE 50 MG/1
50 TABLET ORAL 2 TIMES DAILY PRN
Status: CANCELLED | OUTPATIENT
Start: 2023-12-31

## 2023-12-31 RX ORDER — ACETAMINOPHEN 650 MG/1
650 SUPPOSITORY RECTAL EVERY 6 HOURS PRN
Status: CANCELLED | OUTPATIENT
Start: 2023-12-31

## 2023-12-31 RX ORDER — SODIUM CHLORIDE 9 MG/ML
INJECTION, SOLUTION INTRAVENOUS PRN
Status: CANCELLED | OUTPATIENT
Start: 2023-12-31

## 2023-12-31 RX ORDER — SODIUM CHLORIDE 0.9 % (FLUSH) 0.9 %
5-40 SYRINGE (ML) INJECTION PRN
Status: DISCONTINUED | OUTPATIENT
Start: 2023-12-31 | End: 2023-12-31 | Stop reason: HOSPADM

## 2023-12-31 RX ORDER — ATORVASTATIN CALCIUM 40 MG/1
40 TABLET, FILM COATED ORAL DAILY
Status: DISCONTINUED | OUTPATIENT
Start: 2024-01-01 | End: 2024-01-04 | Stop reason: HOSPADM

## 2023-12-31 RX ORDER — POTASSIUM CHLORIDE 7.45 MG/ML
10 INJECTION INTRAVENOUS PRN
Status: CANCELLED | OUTPATIENT
Start: 2023-12-31

## 2023-12-31 RX ORDER — ALPRAZOLAM 0.5 MG/1
0.5 TABLET ORAL 2 TIMES DAILY PRN
Status: CANCELLED | OUTPATIENT
Start: 2023-12-31

## 2023-12-31 RX ORDER — AMITRIPTYLINE HYDROCHLORIDE 25 MG/1
25 TABLET, FILM COATED ORAL DAILY
Status: CANCELLED | OUTPATIENT
Start: 2024-01-01

## 2023-12-31 RX ORDER — ATORVASTATIN CALCIUM 40 MG/1
40 TABLET, FILM COATED ORAL DAILY
Status: CANCELLED | OUTPATIENT
Start: 2024-01-01

## 2023-12-31 RX ORDER — PANTOPRAZOLE SODIUM 40 MG/1
40 TABLET, DELAYED RELEASE ORAL
Status: DISCONTINUED | OUTPATIENT
Start: 2024-01-01 | End: 2024-01-04 | Stop reason: HOSPADM

## 2023-12-31 RX ORDER — ACETAMINOPHEN 325 MG/1
650 TABLET ORAL EVERY 6 HOURS PRN
Status: DISCONTINUED | OUTPATIENT
Start: 2023-12-31 | End: 2024-01-04 | Stop reason: HOSPADM

## 2023-12-31 RX ORDER — MAGNESIUM SULFATE IN WATER 40 MG/ML
2000 INJECTION, SOLUTION INTRAVENOUS PRN
Status: DISCONTINUED | OUTPATIENT
Start: 2023-12-31 | End: 2024-01-04 | Stop reason: HOSPADM

## 2023-12-31 RX ORDER — SODIUM CHLORIDE 0.9 % (FLUSH) 0.9 %
5-40 SYRINGE (ML) INJECTION PRN
Status: DISCONTINUED | OUTPATIENT
Start: 2023-12-31 | End: 2024-01-04 | Stop reason: HOSPADM

## 2023-12-31 RX ORDER — ONDANSETRON 2 MG/ML
4 INJECTION INTRAMUSCULAR; INTRAVENOUS EVERY 6 HOURS PRN
Status: CANCELLED | OUTPATIENT
Start: 2023-12-31

## 2023-12-31 RX ORDER — HYDROCODONE BITARTRATE AND ACETAMINOPHEN 5; 325 MG/1; MG/1
1 TABLET ORAL EVERY 6 HOURS PRN
Status: CANCELLED | OUTPATIENT
Start: 2023-12-31

## 2023-12-31 RX ORDER — POLYETHYLENE GLYCOL 3350 17 G/17G
17 POWDER, FOR SOLUTION ORAL DAILY PRN
Status: DISCONTINUED | OUTPATIENT
Start: 2023-12-31 | End: 2024-01-04 | Stop reason: HOSPADM

## 2023-12-31 RX ORDER — POLYETHYLENE GLYCOL 3350 17 G/17G
17 POWDER, FOR SOLUTION ORAL DAILY PRN
Status: CANCELLED | OUTPATIENT
Start: 2023-12-31

## 2023-12-31 RX ORDER — AMLODIPINE BESYLATE 5 MG/1
5 TABLET ORAL DAILY
Status: DISCONTINUED | OUTPATIENT
Start: 2024-01-01 | End: 2024-01-04 | Stop reason: HOSPADM

## 2023-12-31 RX ORDER — SODIUM CHLORIDE 9 MG/ML
INJECTION, SOLUTION INTRAVENOUS PRN
Status: DISCONTINUED | OUTPATIENT
Start: 2023-12-31 | End: 2024-01-04 | Stop reason: HOSPADM

## 2023-12-31 RX ORDER — SODIUM CHLORIDE 0.9 % (FLUSH) 0.9 %
5-40 SYRINGE (ML) INJECTION EVERY 12 HOURS SCHEDULED
Status: DISCONTINUED | OUTPATIENT
Start: 2023-12-31 | End: 2024-01-04 | Stop reason: HOSPADM

## 2023-12-31 RX ORDER — POTASSIUM CHLORIDE 20 MEQ/1
40 TABLET, EXTENDED RELEASE ORAL PRN
Status: CANCELLED | OUTPATIENT
Start: 2023-12-31

## 2023-12-31 RX ORDER — PANTOPRAZOLE SODIUM 40 MG/1
40 TABLET, DELAYED RELEASE ORAL
Status: CANCELLED | OUTPATIENT
Start: 2024-01-01

## 2023-12-31 RX ORDER — ENOXAPARIN SODIUM 100 MG/ML
40 INJECTION SUBCUTANEOUS EVERY 24 HOURS
Status: DISCONTINUED | OUTPATIENT
Start: 2024-01-01 | End: 2024-01-04 | Stop reason: HOSPADM

## 2023-12-31 RX ORDER — SODIUM CHLORIDE 0.9 % (FLUSH) 0.9 %
5-40 SYRINGE (ML) INJECTION EVERY 12 HOURS SCHEDULED
Status: CANCELLED | OUTPATIENT
Start: 2023-12-31

## 2023-12-31 RX ORDER — DEXAMETHASONE SODIUM PHOSPHATE 4 MG/ML
4 INJECTION, SOLUTION INTRA-ARTICULAR; INTRALESIONAL; INTRAMUSCULAR; INTRAVENOUS; SOFT TISSUE EVERY 6 HOURS
Status: DISCONTINUED | OUTPATIENT
Start: 2023-12-31 | End: 2023-12-31 | Stop reason: HOSPADM

## 2023-12-31 RX ORDER — ALPRAZOLAM 0.5 MG/1
0.5 TABLET ORAL 2 TIMES DAILY PRN
Status: DISCONTINUED | OUTPATIENT
Start: 2023-12-31 | End: 2024-01-04 | Stop reason: HOSPADM

## 2023-12-31 RX ORDER — ONDANSETRON 2 MG/ML
4 INJECTION INTRAMUSCULAR; INTRAVENOUS EVERY 6 HOURS PRN
Status: DISCONTINUED | OUTPATIENT
Start: 2023-12-31 | End: 2024-01-04 | Stop reason: HOSPADM

## 2023-12-31 RX ORDER — LEVOTHYROXINE SODIUM 0.05 MG/1
50 TABLET ORAL DAILY
Status: DISCONTINUED | OUTPATIENT
Start: 2024-01-01 | End: 2024-01-01

## 2023-12-31 RX ORDER — SUCRALFATE 1 G/1
1 TABLET ORAL 4 TIMES DAILY
Status: DISCONTINUED | OUTPATIENT
Start: 2023-12-31 | End: 2024-01-04 | Stop reason: HOSPADM

## 2023-12-31 RX ORDER — TRAZODONE HYDROCHLORIDE 50 MG/1
50 TABLET ORAL NIGHTLY
Status: DISCONTINUED | OUTPATIENT
Start: 2023-12-31 | End: 2024-01-04 | Stop reason: HOSPADM

## 2023-12-31 RX ORDER — ONDANSETRON 4 MG/1
4 TABLET, ORALLY DISINTEGRATING ORAL EVERY 8 HOURS PRN
Status: DISCONTINUED | OUTPATIENT
Start: 2023-12-31 | End: 2024-01-04 | Stop reason: HOSPADM

## 2023-12-31 RX ORDER — SODIUM CHLORIDE 9 MG/ML
INJECTION, SOLUTION INTRAVENOUS PRN
Status: DISCONTINUED | OUTPATIENT
Start: 2023-12-31 | End: 2023-12-31 | Stop reason: HOSPADM

## 2023-12-31 RX ORDER — MAGNESIUM SULFATE IN WATER 40 MG/ML
2000 INJECTION, SOLUTION INTRAVENOUS PRN
Status: CANCELLED | OUTPATIENT
Start: 2023-12-31

## 2023-12-31 RX ORDER — DEXAMETHASONE SODIUM PHOSPHATE 10 MG/ML
10 INJECTION INTRAMUSCULAR; INTRAVENOUS ONCE
Status: COMPLETED | OUTPATIENT
Start: 2023-12-31 | End: 2023-12-31

## 2023-12-31 RX ORDER — FLUTICASONE PROPIONATE 50 MCG
2 SPRAY, SUSPENSION (ML) NASAL DAILY
Status: DISCONTINUED | OUTPATIENT
Start: 2024-01-01 | End: 2024-01-04 | Stop reason: HOSPADM

## 2023-12-31 RX ORDER — TRAMADOL HYDROCHLORIDE 50 MG/1
50 TABLET ORAL 2 TIMES DAILY PRN
Status: DISCONTINUED | OUTPATIENT
Start: 2023-12-31 | End: 2024-01-04 | Stop reason: HOSPADM

## 2023-12-31 RX ORDER — ACETAMINOPHEN 650 MG/1
650 SUPPOSITORY RECTAL EVERY 6 HOURS PRN
Status: DISCONTINUED | OUTPATIENT
Start: 2023-12-31 | End: 2024-01-04 | Stop reason: HOSPADM

## 2023-12-31 RX ORDER — SODIUM CHLORIDE 0.9 % (FLUSH) 0.9 %
5-40 SYRINGE (ML) INJECTION EVERY 12 HOURS SCHEDULED
Status: DISCONTINUED | OUTPATIENT
Start: 2023-12-31 | End: 2023-12-31 | Stop reason: HOSPADM

## 2023-12-31 RX ORDER — POTASSIUM CHLORIDE 20 MEQ/1
40 TABLET, EXTENDED RELEASE ORAL PRN
Status: DISCONTINUED | OUTPATIENT
Start: 2023-12-31 | End: 2024-01-04 | Stop reason: HOSPADM

## 2023-12-31 RX ORDER — HYDROCODONE BITARTRATE AND ACETAMINOPHEN 5; 325 MG/1; MG/1
1 TABLET ORAL EVERY 6 HOURS PRN
Status: DISCONTINUED | OUTPATIENT
Start: 2023-12-31 | End: 2024-01-04 | Stop reason: HOSPADM

## 2023-12-31 RX ORDER — SUCRALFATE 1 G/1
1 TABLET ORAL 4 TIMES DAILY
Status: CANCELLED | OUTPATIENT
Start: 2023-12-31

## 2023-12-31 RX ORDER — SODIUM CHLORIDE 0.9 % (FLUSH) 0.9 %
5-40 SYRINGE (ML) INJECTION PRN
Status: CANCELLED | OUTPATIENT
Start: 2023-12-31

## 2023-12-31 RX ORDER — ENOXAPARIN SODIUM 100 MG/ML
40 INJECTION SUBCUTANEOUS DAILY
Status: CANCELLED | OUTPATIENT
Start: 2024-01-01

## 2023-12-31 RX ORDER — ACETAMINOPHEN 325 MG/1
650 TABLET ORAL EVERY 6 HOURS PRN
Status: CANCELLED | OUTPATIENT
Start: 2023-12-31

## 2023-12-31 RX ORDER — ONDANSETRON 4 MG/1
4 TABLET, ORALLY DISINTEGRATING ORAL EVERY 8 HOURS PRN
Status: CANCELLED | OUTPATIENT
Start: 2023-12-31

## 2023-12-31 RX ORDER — LIDOCAINE HYDROCHLORIDE 20 MG/ML
INJECTION, SOLUTION INFILTRATION; PERINEURAL PRN
Status: COMPLETED | OUTPATIENT
Start: 2023-12-31 | End: 2023-12-31

## 2023-12-31 RX ORDER — DEXAMETHASONE SODIUM PHOSPHATE 10 MG/ML
10 INJECTION INTRAMUSCULAR; INTRAVENOUS ONCE
OUTPATIENT
Start: 2023-12-31

## 2023-12-31 RX ORDER — AMITRIPTYLINE HYDROCHLORIDE 25 MG/1
25 TABLET, FILM COATED ORAL DAILY
Status: DISCONTINUED | OUTPATIENT
Start: 2024-01-01 | End: 2024-01-04 | Stop reason: HOSPADM

## 2023-12-31 RX ORDER — DEXAMETHASONE SODIUM PHOSPHATE 4 MG/ML
4 INJECTION, SOLUTION INTRA-ARTICULAR; INTRALESIONAL; INTRAMUSCULAR; INTRAVENOUS; SOFT TISSUE EVERY 6 HOURS
OUTPATIENT
Start: 2023-12-31

## 2023-12-31 RX ORDER — LEVOTHYROXINE SODIUM 0.05 MG/1
50 TABLET ORAL DAILY
Status: CANCELLED | OUTPATIENT
Start: 2024-01-01

## 2023-12-31 RX ORDER — FLUTICASONE PROPIONATE 50 MCG
2 SPRAY, SUSPENSION (ML) NASAL DAILY
Status: CANCELLED | OUTPATIENT
Start: 2024-01-01

## 2023-12-31 RX ORDER — TRAZODONE HYDROCHLORIDE 50 MG/1
50 TABLET ORAL NIGHTLY
Status: CANCELLED | OUTPATIENT
Start: 2023-12-31

## 2023-12-31 RX ORDER — POTASSIUM CHLORIDE 7.45 MG/ML
10 INJECTION INTRAVENOUS PRN
Status: DISCONTINUED | OUTPATIENT
Start: 2023-12-31 | End: 2024-01-04 | Stop reason: HOSPADM

## 2023-12-31 RX ADMIN — SODIUM CHLORIDE, PRESERVATIVE FREE 10 ML: 5 INJECTION INTRAVENOUS at 09:17

## 2023-12-31 RX ADMIN — SUCRALFATE 1 G: 1 TABLET ORAL at 12:40

## 2023-12-31 RX ADMIN — FLUTICASONE PROPIONATE 2 SPRAY: 50 SPRAY, METERED NASAL at 09:20

## 2023-12-31 RX ADMIN — LEVOTHYROXINE SODIUM 50 MCG: 0.05 TABLET ORAL at 09:13

## 2023-12-31 RX ADMIN — SUCRALFATE 1 G: 1 TABLET ORAL at 09:12

## 2023-12-31 RX ADMIN — WATER 1000 MG: 1 INJECTION INTRAMUSCULAR; INTRAVENOUS; SUBCUTANEOUS at 20:38

## 2023-12-31 RX ADMIN — LIDOCAINE HYDROCHLORIDE 5 ML: 20 INJECTION, SOLUTION INFILTRATION; PERINEURAL at 17:30

## 2023-12-31 RX ADMIN — PANTOPRAZOLE SODIUM 40 MG: 40 TABLET, DELAYED RELEASE ORAL at 05:24

## 2023-12-31 RX ADMIN — SERTRALINE HYDROCHLORIDE 100 MG: 50 TABLET ORAL at 09:12

## 2023-12-31 RX ADMIN — SODIUM CHLORIDE, PRESERVATIVE FREE 10 ML: 5 INJECTION INTRAVENOUS at 20:38

## 2023-12-31 RX ADMIN — SUCRALFATE 1 G: 1 TABLET ORAL at 20:37

## 2023-12-31 RX ADMIN — WATER 1000 MG: 1 INJECTION INTRAMUSCULAR; INTRAVENOUS; SUBCUTANEOUS at 05:25

## 2023-12-31 RX ADMIN — ENOXAPARIN SODIUM 40 MG: 100 INJECTION SUBCUTANEOUS at 09:14

## 2023-12-31 RX ADMIN — AMITRIPTYLINE HYDROCHLORIDE 25 MG: 25 TABLET, FILM COATED ORAL at 09:13

## 2023-12-31 RX ADMIN — ATORVASTATIN CALCIUM 40 MG: 40 TABLET, FILM COATED ORAL at 09:13

## 2023-12-31 RX ADMIN — AMLODIPINE BESYLATE 5 MG: 5 TABLET ORAL at 09:13

## 2023-12-31 RX ADMIN — DEXAMETHASONE SODIUM PHOSPHATE 10 MG: 10 INJECTION INTRAMUSCULAR; INTRAVENOUS at 15:02

## 2023-12-31 RX ADMIN — SODIUM CHLORIDE, PRESERVATIVE FREE 10 ML: 5 INJECTION INTRAVENOUS at 20:45

## 2023-12-31 RX ADMIN — TRAZODONE HYDROCHLORIDE 50 MG: 50 TABLET ORAL at 20:37

## 2023-12-31 ASSESSMENT — PAIN SCALES - GENERAL
PAINLEVEL_OUTOF10: 0
PAINLEVEL_OUTOF10: 0

## 2023-12-31 ASSESSMENT — PAIN - FUNCTIONAL ASSESSMENT: PAIN_FUNCTIONAL_ASSESSMENT: NONE - DENIES PAIN

## 2023-12-31 NOTE — PROGRESS NOTES
Pt just arrived from [facility] who went to IR to have lumbar puncture today pt is resting in bed no complaints of pain or discomfort family at bedside pt Albanian speaking only family unsure of medications pt takes will bring in list tomorrow morning call light in reach

## 2023-12-31 NOTE — PLAN OF CARE
Problem: Discharge Planning  Goal: Discharge to home or other facility with appropriate resources  Outcome: Progressing  Flowsheets (Taken 12/30/2023 1422 by Johanna Herr, RN)  Discharge to home or other facility with appropriate resources: Identify barriers to discharge with patient and caregiver     Problem: Skin/Tissue Integrity  Goal: Absence of new skin breakdown  Description: 1.  Monitor for areas of redness and/or skin breakdown  2.  Assess vascular access sites hourly  3.  Every 4-6 hours minimum:  Change oxygen saturation probe site  4.  Every 4-6 hours:  If on nasal continuous positive airway pressure, respiratory therapy assess nares and determine need for appliance change or resting period.  Outcome: Progressing     Problem: ABCDS Injury Assessment  Goal: Absence of physical injury  Outcome: Progressing     Problem: Safety - Adult  Goal: Free from fall injury  Outcome: Progressing     Problem: Pain  Goal: Verbalizes/displays adequate comfort level or baseline comfort level  Outcome: Progressing

## 2023-12-31 NOTE — H&P
Placeholder. Patient is being transferred to [facility] [place] for lumbar puncture and further management of lymphoma.  Please see prior progress note    Fern Perez DO

## 2023-12-31 NOTE — OR NURSING
TRANSFER - OUT REPORT:           Verbal report given to MILLER Harrington on Imelda Coombs  being transferred to 5th Floor for routine progression of patient care      Report consisted of patient’s Situation, Background, Assessment and Recommendations(SBAR).          Information from the following report(s) SBAR, Procedure Summary, and MAR was reviewed with the receiving nurse.       Opportunity for questions and clarification was provided.          Conscious Sedation:    None    Pt tolerated procedure well.      Peripheral Intravenous Line:   Peripheral IV 12/30/23 Left Antecubital (Active)   Site Assessment Clean, dry & intact 12/31/23 0740   Line Status Capped;Normal saline locked 12/31/23 0740   Line Care Connections checked and tightened 12/31/23 0740   Phlebitis Assessment No symptoms 12/31/23 0740   Infiltration Assessment 0 12/31/23 0740   Dressing Status Clean, dry & intact 12/31/23 0740   Dressing Type Transparent 12/31/23 0740       Lumbar spine  bandaid-type dressing clean, dry, intact, and nontender

## 2023-12-31 NOTE — PROGRESS NOTES
PT/OT note:  Spoke with oncologist and it was recommended that therapy evaluations be held at this time. Patient to transfer to [facility] for lumbar puncture and further medical management.  Will complete therapy orders at this time.  Please re-consult therapy when appropriate.      Beatriz Bunch, PT  
Please go over MRI screening form with a  for all patients when English isn't their primary language. Family members can be confused as to what terminology means and a area of the body may not be articulated properly. This patient has a port (which is completely MRI safe) and had pointed to the chest area leading to an incorrect answer on the form. This patient does not have a pacemaker and is in fact eligible for MRI. This lead to confusion and extra testing for the patient accruing extra cost and delay of care.   
0.0 - 0.9 mg/dL   Culture, Urine    Collection Time: 12/30/23  4:27 AM    Specimen: Urine, clean catch   Result Value Ref Range    Special Requests NO SPECIAL REQUESTS      Culture (A)       >100,000 COLONIES/mL Gram negative rods IDENTIFICATION AND SUSCEPTIBILITY TO FOLLOW    Culture <1,000 CFU/ML MIXED SKIN TUNDE ISOLATED     TSH with Reflex    Collection Time: 12/30/23  4:27 AM   Result Value Ref Range    TSH w Free Thyroid if Abnormal 6.52 (H) 0.358 - 3.740 UIU/ML   Folate    Collection Time: 12/30/23  4:27 AM   Result Value Ref Range    Folate 13.0 3.1 - 17.5 ng/mL   Vitamin B12    Collection Time: 12/30/23  4:27 AM   Result Value Ref Range    Vitamin B-12 1256 (H) 193 - 986 pg/mL   T4, Free    Collection Time: 12/30/23  4:27 AM   Result Value Ref Range    T4 Free 1.4 0.78 - 1.46 NG/DL   Vitamin D 25 Hydroxy    Collection Time: 12/30/23  3:58 PM   Result Value Ref Range    Vit D, 25-Hydroxy 30.9 30.0 - 100.0 ng/mL   Basic Metabolic Panel w/ Reflex to MG    Collection Time: 12/31/23  6:01 AM   Result Value Ref Range    Sodium 143 136 - 146 mmol/L    Potassium 3.7 3.5 - 5.1 mmol/L    Chloride 111 103 - 113 mmol/L    CO2 23 21 - 32 mmol/L    Anion Gap 9 2 - 11 mmol/L    Glucose 106 (H) 65 - 100 mg/dL    BUN 25 (H) 8 - 23 MG/DL    Creatinine 1.31 (H) 0.6 - 1.0 MG/DL    Est, Glom Filt Rate 42 (L) >60 ml/min/1.73m2    Calcium 9.5 8.3 - 10.4 MG/DL   CBC with Auto Differential    Collection Time: 12/31/23  6:01 AM   Result Value Ref Range    WBC 6.0 4.3 - 11.1 K/uL    RBC 4.46 4.05 - 5.2 M/uL    Hemoglobin 13.7 11.7 - 15.4 g/dL    Hematocrit 41.0 35.8 - 46.3 %    MCV 91.9 82.0 - 102.0 FL    MCH 30.7 26.1 - 32.9 PG    MCHC 33.4 31.4 - 35.0 g/dL    RDW 14.6 11.9 - 14.6 %    Platelets 233 150 - 450 K/uL    MPV 9.6 9.4 - 12.3 FL    nRBC 0.00 0.0 - 0.2 K/uL    Differential Type AUTOMATED      Neutrophils % 54 43 - 78 %    Lymphocytes % 30 13 - 44 %    Monocytes % 10 4.0 - 12.0 %    Eosinophils % 4 0.5 - 7.8 %    Basophils %

## 2023-12-31 NOTE — CONSULTS
Session ID: [id number]  Language: Turkish   ID: #[id number]   Name: Enriqueta
Session ID: [id number]  Language: Persian   ID: #[id number]   Name: Andree
neural foramina are patent.  Mild to moderate  disc bulge.  Mild facet hypertrophy and ligamentum flavum thickening.    At L3-4, enhancing soft tissue mass filling the left foramen.  Mild to moderate  facet hypertrophy with mild ligamentum flavum thickening.  Mild disc bulge.  The  right foramen is patent.    At L4-5, enhancing soft tissue mass in the right foramen.  Mild to moderate disc  bulge.  Moderate facet apostrophe with mild ligamentum flavum thickening.  Mild  bilateral neural foraminal stenosis.    At L5-S1, moderate disc bulge.  Mild facet hypertrophy and mild to moderate  ligamentum flavum thickening.  Mild bilateral neural foraminal stenosis.  Central canal is patent.    Impression  1.  Solid enhancing masses arising from multiple nerve roots as described above.  Largest is located in the left L3-4 neural foramen and likely explains the  clinical symptoms.  Differential includes malignancy/metastatic disease and  multiple benign nerve sheath tumors.  Patient has a history of lymphoma, and  this could represent lymphoma.  Recommend lumbar puncture.  2.  Multilevel degenerative disease as described above, with mild degenerative  retrolisthesis at L1-2 and L2-3.  No evidence of disc herniation or central  canal stenosis.    Findings discussed with MANE OLSON on 12/30/2023 2:58 PM.        ASSESSMENT:  78 years old female patient with medical problems including DLBCL completed 6 cycles of R mini CHOP in May 2023 (Dr. Melani Bryson) and CR based on posttreatment PET/CT in May 2023.  She is admitted with a UTI and bilateral lower extremity weakness.  MRI lumbar spine showed enhancing masses arising from multiple nerve roots.  Per my discussion with , these masses have increased in size since prior CT on 12/7/2023.  She has had an LP with CSF analysis at [facility] in March 2023.  It was a low cellularity specimen but was negative by flow cytometry for immunophenotypic evidence of lymphoma.    PLAN:  I

## 2023-12-31 NOTE — OR NURSING
21.5 ml of clear yellow fluid removed from spine.  Pt tolerated the procedure well.  Pt to be transported to room 503.

## 2023-12-31 NOTE — BRIEF OP NOTE
[facility]  Department of Interventional Radiology  [phone]       Brief Procedure Note    Patient: Imelda Coombs MRN: [id number]  SSN: [id number]    YOB: 1945  Age: 78 y.o.  Sex: female      Date of Procedure: 12/31/2023    Pre-Procedure Diagnosis: B-Cell Lymphoma.  SSO.  Deaf.  Enlarging tumors at multiple nerve roots.     Post-Procedure Diagnosis: SAME    Procedure(s):  Lumbar Puncture    Brief Description of Procedure: as above    Performed By: HERLINDA WILEY MD     Assistants: None    Anesthesia:Lidocaine    Estimated Blood Loss: None    Specimens:  CSF to lab    Implants:  None    Findings: Slightly yellow, thin, fluid.     Complications: None    Recommendations: 1 hour bedrest.  IVHydration.      Follow Up: PRN    Signed By: HERLINDA WILEY MD     December 31, 2023

## 2023-12-31 NOTE — CARE COORDINATION
discuss the discharge plan with any other family members/significant others, and if so, who? Yes  (Daughter)   Financial Resources Medicare;Medicaid   Community Resources None   Social/Functional History   Lives With Daughter   Type of Home House   Home Equipment   (NAVI)   Discharge Planning   Type of Residence House   Living Arrangements Family Members   Current Services Prior To Admission None   Potential Assistance Needed N/A   DME Ordered? No   Type of Home Care Services None   Patient expects to be discharged to: [facility]

## 2023-12-31 NOTE — DISCHARGE SUMMARY
Placeholder.  Patient is being transferred to [facility] for lumbar puncture and further management of lymphoma.    Fern Perez DO

## 2024-01-01 ENCOUNTER — APPOINTMENT (OUTPATIENT)
Dept: MRI IMAGING | Age: 79
DRG: 841 | End: 2024-01-01
Attending: STUDENT IN AN ORGANIZED HEALTH CARE EDUCATION/TRAINING PROGRAM
Payer: MEDICARE

## 2024-01-01 ENCOUNTER — HOME CARE VISIT (OUTPATIENT)
Dept: SCHEDULING | Facility: HOME HEALTH | Age: 79
End: 2024-01-01
Payer: MEDICARE

## 2024-01-01 ENCOUNTER — HOME CARE VISIT (OUTPATIENT)
Dept: HOSPICE | Facility: HOSPICE | Age: 79
End: 2024-01-01
Payer: MEDICARE

## 2024-01-01 VITALS
HEART RATE: 86 BPM | SYSTOLIC BLOOD PRESSURE: 112 MMHG | DIASTOLIC BLOOD PRESSURE: 66 MMHG | RESPIRATION RATE: 14 BRPM | TEMPERATURE: 96.9 F

## 2024-01-01 VITALS
RESPIRATION RATE: 12 BRPM | HEART RATE: 62 BPM | TEMPERATURE: 97.2 F | DIASTOLIC BLOOD PRESSURE: 38 MMHG | SYSTOLIC BLOOD PRESSURE: 60 MMHG

## 2024-01-01 DIAGNOSIS — D49.2 LUMBAR SPINE TUMOR: ICD-10-CM

## 2024-01-01 DIAGNOSIS — G95.89 MASS OF SPINAL CORD (HCC): ICD-10-CM

## 2024-01-01 DIAGNOSIS — C83.30 DIFFUSE LARGE B-CELL LYMPHOMA, UNSPECIFIED BODY REGION (HCC): Primary | ICD-10-CM

## 2024-01-01 DIAGNOSIS — C41.2 MALIGNANT NEOPLASM OF VERTEBRAL COLUMN (HCC): ICD-10-CM

## 2024-01-01 LAB
ANION GAP SERPL CALC-SCNC: 6 MMOL/L (ref 2–11)
BACTERIA SPEC CULT: ABNORMAL
BACTERIA SPEC CULT: ABNORMAL
BASOPHILS # BLD: 0 K/UL (ref 0–0.2)
BASOPHILS NFR BLD: 0 % (ref 0–2)
BUN SERPL-MCNC: 22 MG/DL (ref 8–23)
C GATTII+NEOFOR DNA CSF QL NAA+NON-PROBE: NOT DETECTED
CALCIUM SERPL-MCNC: 9.3 MG/DL (ref 8.3–10.4)
CHLORIDE SERPL-SCNC: 110 MMOL/L (ref 103–113)
CMV DNA CSF QL NAA+NON-PROBE: NOT DETECTED
CO2 SERPL-SCNC: 21 MMOL/L (ref 21–32)
CREAT SERPL-MCNC: 1.1 MG/DL (ref 0.6–1)
DIFFERENTIAL METHOD BLD: ABNORMAL
E COLI K1 DNA CSF QL NAA+NON-PROBE: NOT DETECTED
EOSINOPHIL # BLD: 0 K/UL (ref 0–0.8)
EOSINOPHIL NFR BLD: 0 % (ref 0.5–7.8)
ERYTHROCYTE [DISTWIDTH] IN BLOOD BY AUTOMATED COUNT: 13.9 % (ref 11.9–14.6)
EV RNA CSF QL NAA+NON-PROBE: NOT DETECTED
GLUCOSE SERPL-MCNC: 143 MG/DL (ref 65–100)
GP B STREP DNA CSF QL NAA+NON-PROBE: NOT DETECTED
HAEM INFLU DNA CSF QL NAA+NON-PROBE: NOT DETECTED
HCT VFR BLD AUTO: 40.7 % (ref 35.8–46.3)
HGB BLD-MCNC: 13.8 G/DL (ref 11.7–15.4)
HHV6 DNA CSF QL NAA+NON-PROBE: NOT DETECTED
HSV1 DNA CSF QL NAA+PROBE: NOT DETECTED
HSV2 DNA CSF QL NAA+NON-PROBE: NOT DETECTED
IMM GRANULOCYTES # BLD AUTO: 0 K/UL (ref 0–0.5)
IMM GRANULOCYTES NFR BLD AUTO: 0 % (ref 0–5)
L MONOCYTOG DNA CSF QL NAA+NON-PROBE: NOT DETECTED
LYMPHOCYTES # BLD: 1.2 K/UL (ref 0.5–4.6)
LYMPHOCYTES NFR BLD: 23 % (ref 13–44)
MCH RBC QN AUTO: 31.2 PG (ref 26.1–32.9)
MCHC RBC AUTO-ENTMCNC: 33.9 G/DL (ref 31.4–35)
MCV RBC AUTO: 91.9 FL (ref 82–102)
MONOCYTES # BLD: 0.1 K/UL (ref 0.1–1.3)
MONOCYTES NFR BLD: 2 % (ref 4–12)
N MEN DNA CSF QL NAA+NON-PROBE: NOT DETECTED
NEUTS SEG # BLD: 3.8 K/UL (ref 1.7–8.2)
NEUTS SEG NFR BLD: 75 % (ref 43–78)
NRBC # BLD: 0 K/UL (ref 0–0.2)
PARECHOVIRUS A RNA CSF QL NAA+NON-PROBE: NOT DETECTED
PLATELET # BLD AUTO: 235 K/UL (ref 150–450)
PMV BLD AUTO: 9.7 FL (ref 9.4–12.3)
POTASSIUM SERPL-SCNC: 3.6 MMOL/L (ref 3.5–5.1)
RBC # BLD AUTO: 4.43 M/UL (ref 4.05–5.2)
S PNEUM DNA CSF QL NAA+NON-PROBE: NOT DETECTED
SERVICE CMNT-IMP: ABNORMAL
SODIUM SERPL-SCNC: 137 MMOL/L (ref 136–146)
VZV DNA CSF QL NAA+NON-PROBE: NOT DETECTED
WBC # BLD AUTO: 5 K/UL (ref 4.3–11.1)

## 2024-01-01 PROCEDURE — 6360000002 HC RX W HCPCS: Performed by: FAMILY MEDICINE

## 2024-01-01 PROCEDURE — 72157 MRI CHEST SPINE W/O & W/DYE: CPT

## 2024-01-01 PROCEDURE — 6360000002 HC RX W HCPCS: Performed by: INTERNAL MEDICINE

## 2024-01-01 PROCEDURE — 2580000003 HC RX 258: Performed by: STUDENT IN AN ORGANIZED HEALTH CARE EDUCATION/TRAINING PROGRAM

## 2024-01-01 PROCEDURE — 80048 BASIC METABOLIC PNL TOTAL CA: CPT

## 2024-01-01 PROCEDURE — 70553 MRI BRAIN STEM W/O & W/DYE: CPT

## 2024-01-01 PROCEDURE — 2580000003 HC RX 258: Performed by: FAMILY MEDICINE

## 2024-01-01 PROCEDURE — 97535 SELF CARE MNGMENT TRAINING: CPT

## 2024-01-01 PROCEDURE — A9579 GAD-BASE MR CONTRAST NOS,1ML: HCPCS | Performed by: FAMILY MEDICINE

## 2024-01-01 PROCEDURE — 99233 SBSQ HOSP IP/OBS HIGH 50: CPT | Performed by: INTERNAL MEDICINE

## 2024-01-01 PROCEDURE — 97530 THERAPEUTIC ACTIVITIES: CPT

## 2024-01-01 PROCEDURE — 1100000000 HC RM PRIVATE

## 2024-01-01 PROCEDURE — 97167 OT EVAL HIGH COMPLEX 60 MIN: CPT

## 2024-01-01 PROCEDURE — G0299 HHS/HOSPICE OF RN EA 15 MIN: HCPCS

## 2024-01-01 PROCEDURE — 6360000002 HC RX W HCPCS: Performed by: STUDENT IN AN ORGANIZED HEALTH CARE EDUCATION/TRAINING PROGRAM

## 2024-01-01 PROCEDURE — APPSS45 APP SPLIT SHARED TIME 31-45 MINUTES

## 2024-01-01 PROCEDURE — G0156 HHCP-SVS OF AIDE,EA 15 MIN: HCPCS

## 2024-01-01 PROCEDURE — 6370000000 HC RX 637 (ALT 250 FOR IP): Performed by: FAMILY MEDICINE

## 2024-01-01 PROCEDURE — 85025 COMPLETE CBC W/AUTO DIFF WBC: CPT

## 2024-01-01 PROCEDURE — 36415 COLL VENOUS BLD VENIPUNCTURE: CPT

## 2024-01-01 PROCEDURE — 72156 MRI NECK SPINE W/O & W/DYE: CPT

## 2024-01-01 PROCEDURE — 6360000004 HC RX CONTRAST MEDICATION: Performed by: FAMILY MEDICINE

## 2024-01-01 PROCEDURE — 6370000000 HC RX 637 (ALT 250 FOR IP): Performed by: INTERNAL MEDICINE

## 2024-01-01 PROCEDURE — 97162 PT EVAL MOD COMPLEX 30 MIN: CPT

## 2024-01-01 RX ORDER — DEXAMETHASONE SODIUM PHOSPHATE 4 MG/ML
4 INJECTION, SOLUTION INTRA-ARTICULAR; INTRALESIONAL; INTRAMUSCULAR; INTRAVENOUS; SOFT TISSUE EVERY 6 HOURS
Status: DISCONTINUED | OUTPATIENT
Start: 2024-01-01 | End: 2024-01-04 | Stop reason: HOSPADM

## 2024-01-01 RX ORDER — DOCUSATE SODIUM 100 MG/1
100 CAPSULE, LIQUID FILLED ORAL 2 TIMES DAILY
Status: DISCONTINUED | OUTPATIENT
Start: 2024-01-01 | End: 2024-01-04 | Stop reason: HOSPADM

## 2024-01-01 RX ORDER — SODIUM CHLORIDE 0.9 % (FLUSH) 0.9 %
10 SYRINGE (ML) INJECTION AS NEEDED
Status: DISCONTINUED | OUTPATIENT
Start: 2024-01-01 | End: 2024-01-04 | Stop reason: HOSPADM

## 2024-01-01 RX ORDER — POLYETHYLENE GLYCOL 3350 17 G/17G
17 POWDER, FOR SOLUTION ORAL DAILY
Status: DISCONTINUED | OUTPATIENT
Start: 2024-01-01 | End: 2024-01-04 | Stop reason: HOSPADM

## 2024-01-01 RX ORDER — LEVOTHYROXINE SODIUM 0.07 MG/1
75 TABLET ORAL DAILY
Status: DISCONTINUED | OUTPATIENT
Start: 2024-01-01 | End: 2024-01-04 | Stop reason: HOSPADM

## 2024-01-01 RX ADMIN — SODIUM CHLORIDE, PRESERVATIVE FREE 10 ML: 5 INJECTION INTRAVENOUS at 06:33

## 2024-01-01 RX ADMIN — PANTOPRAZOLE SODIUM 40 MG: 40 TABLET, DELAYED RELEASE ORAL at 05:35

## 2024-01-01 RX ADMIN — SODIUM CHLORIDE, PRESERVATIVE FREE 10 ML: 5 INJECTION INTRAVENOUS at 14:10

## 2024-01-01 RX ADMIN — DOCUSATE SODIUM 100 MG: 100 CAPSULE, LIQUID FILLED ORAL at 10:40

## 2024-01-01 RX ADMIN — GADOTERIDOL 14 ML: 279.3 INJECTION, SOLUTION INTRAVENOUS at 14:10

## 2024-01-01 RX ADMIN — CEFTRIAXONE SODIUM 2000 MG: 2 INJECTION, POWDER, FOR SOLUTION INTRAMUSCULAR; INTRAVENOUS at 16:04

## 2024-01-01 RX ADMIN — SODIUM CHLORIDE, PRESERVATIVE FREE 10 ML: 5 INJECTION INTRAVENOUS at 20:37

## 2024-01-01 RX ADMIN — POLYETHYLENE GLYCOL 3350 17 G: 17 POWDER, FOR SOLUTION ORAL at 10:40

## 2024-01-01 RX ADMIN — SUCRALFATE 1 G: 1 TABLET ORAL at 08:04

## 2024-01-01 RX ADMIN — DOCUSATE SODIUM 100 MG: 100 CAPSULE, LIQUID FILLED ORAL at 20:16

## 2024-01-01 RX ADMIN — DEXAMETHASONE SODIUM PHOSPHATE 4 MG: 4 INJECTION INTRA-ARTICULAR; INTRALESIONAL; INTRAMUSCULAR; INTRAVENOUS; SOFT TISSUE at 09:02

## 2024-01-01 RX ADMIN — CEFTRIAXONE SODIUM 2000 MG: 2 INJECTION, POWDER, FOR SOLUTION INTRAMUSCULAR; INTRAVENOUS at 05:31

## 2024-01-01 RX ADMIN — TRAZODONE HYDROCHLORIDE 50 MG: 50 TABLET ORAL at 20:12

## 2024-01-01 RX ADMIN — ENOXAPARIN SODIUM 40 MG: 100 INJECTION SUBCUTANEOUS at 10:48

## 2024-01-01 RX ADMIN — HYDROCODONE BITARTRATE AND ACETAMINOPHEN 1 TABLET: 5; 325 TABLET ORAL at 20:27

## 2024-01-01 RX ADMIN — AMITRIPTYLINE HYDROCHLORIDE 25 MG: 25 TABLET, FILM COATED ORAL at 08:05

## 2024-01-01 RX ADMIN — ATORVASTATIN CALCIUM 40 MG: 40 TABLET, FILM COATED ORAL at 08:06

## 2024-01-01 RX ADMIN — SUCRALFATE 1 G: 1 TABLET ORAL at 20:12

## 2024-01-01 RX ADMIN — SODIUM CHLORIDE, PRESERVATIVE FREE 10 ML: 5 INJECTION INTRAVENOUS at 05:31

## 2024-01-01 RX ADMIN — SUCRALFATE 1 G: 1 TABLET ORAL at 16:04

## 2024-01-01 RX ADMIN — SERTRALINE 100 MG: 100 TABLET, FILM COATED ORAL at 08:05

## 2024-01-01 RX ADMIN — DEXAMETHASONE SODIUM PHOSPHATE 4 MG: 4 INJECTION INTRA-ARTICULAR; INTRALESIONAL; INTRAMUSCULAR; INTRAVENOUS; SOFT TISSUE at 16:04

## 2024-01-01 RX ADMIN — AMLODIPINE BESYLATE 5 MG: 5 TABLET ORAL at 08:06

## 2024-01-01 RX ADMIN — LEVOTHYROXINE SODIUM 75 MCG: 0.07 TABLET ORAL at 08:06

## 2024-01-01 RX ADMIN — DEXAMETHASONE SODIUM PHOSPHATE 4 MG: 4 INJECTION INTRA-ARTICULAR; INTRALESIONAL; INTRAMUSCULAR; INTRAVENOUS; SOFT TISSUE at 20:55

## 2024-01-01 ASSESSMENT — PAIN SCALES - GENERAL
PAINLEVEL_OUTOF10: 7
PAINLEVEL_OUTOF10: 0

## 2024-01-01 ASSESSMENT — PAIN DESCRIPTION - ORIENTATION: ORIENTATION: RIGHT;LEFT;MID

## 2024-01-01 ASSESSMENT — PAIN DESCRIPTION - DESCRIPTORS: DESCRIPTORS: SORE

## 2024-01-01 ASSESSMENT — PAIN DESCRIPTION - LOCATION: LOCATION: CHEST

## 2024-01-01 ASSESSMENT — ENCOUNTER SYMPTOMS: BOWEL INCONTINENCE: 1

## 2024-01-01 NOTE — CONSULTS
I was consulted on this patient who unfortunately has lymphoma which has in all likelihood spread to the central nervous system and involves the lumbar spine.  Her lower extremity weakness and paresthesias are explained by the findings on MRI.  Her CSF results would also be consistent with lymphoma.  Lymphoma is known to increase protein and decrease glucose.  Low concern for meningitis, but I added the meningitis encephalitis panel to her CSF.  I will follow-up on these results.      Consult    Patient: Imelda Coombs MRN: [id number]     YOB: 1945  Age: 78 y.o.  Sex: female      Subjective:      Imelda Coombs is a 78 y.o. female who is being seen for bilateral lower extremity weakness.    The patient is Armenian-speaking only.  She has developed gradual but insidious onset sensorimotor deficits in the bilateral lower extremities which are symmetrical.  She has sparing of the bilateral upper extremities and denies facial weakness.  The patient has a history of lymphoma and is treated by oncology.    Past Medical History:   Diagnosis Date    Anxiety     Depression     Hypercholesterolemia     Hypertension     Thyroid disease      Past Surgical History:   Procedure Laterality Date    US BREAST BIOPSY W LOC DEVICE 1ST LESION RIGHT Right 11/21/2022    US BREAST NEEDLE BIOPSY RIGHT 11/21/2022 René Pillai Jr., MD [facility]      Family History   Problem Relation Age of Onset    Breast Cancer Neg Hx      Social History     Tobacco Use    Smoking status: Never     Passive exposure: Past    Smokeless tobacco: Never   Substance Use Topics    Alcohol use: No      Current Facility-Administered Medications   Medication Dose Route Frequency Provider Last Rate Last Admin    levothyroxine (SYNTHROID) tablet 75 mcg  75 mcg Oral Daily Belen Bell MD   75 mcg at 01/01/24 0806    dexAMETHasone (DECADRON) injection 4 mg  4 mg IntraVENous Q6H Belen Bell MD   4 mg at  ondansetron (ZOFRAN-ODT) disintegrating tablet 4 mg  4 mg Oral Q8H PRN Perez, Thu, DO        Or    ondansetron (ZOFRAN) injection 4 mg  4 mg IntraVENous Q6H PRN Perez, Thu, DO        polyethylene glycol (GLYCOLAX) packet 17 g  17 g Oral Daily PRN Perez, Thu, DO        acetaminophen (TYLENOL) tablet 650 mg  650 mg Oral Q6H PRN Perez, Thu, DO        Or    acetaminophen (TYLENOL) suppository 650 mg  650 mg Rectal Q6H PRN Perez, Thu, DO        HYDROcodone-acetaminophen (NORCO) 5-325 MG per tablet 1 tablet  1 tablet Oral Q6H PRN Perez, Thu, DO        sodium chloride flush 0.9 % injection 5-40 mL  5-40 mL IntraVENous 2 times per day Perez, Thu, DO   10 mL at 12/31/23 2038    sodium chloride flush 0.9 % injection 5-40 mL  5-40 mL IntraVENous PRN Perez, Thu, DO        0.9 % sodium chloride infusion   IntraVENous PRN Perez, Thu, DO        cefTRIAXone (ROCEPHIN) 2,000 mg in sodium chloride 0.9 % 50 mL IVPB (mini-bag)  2,000 mg IntraVENous Q12H Sergei Meyer MD   Stopped at 01/01/24 0625        No Known Allergies    Review of Systems:   CONSTITUTIONAL: No weight loss, fever, chills, positive weakness in the bilateral lower extremities and positive for fatigue  HEENT: Eyes: No visual loss, blurred vision, double vision or yellow sclerae. Ears, Nose, Throat: No hearing loss, sneezing, congestion, runny nose or sore throat.  SKIN: No rash or itching.  CARDIOVASCULAR: No chest pain, chest pressure or chest discomfort. No palpitations or edema.  RESPIRATORY: No shortness of breath, cough or sputum.  GASTROINTESTINAL: No anorexia, nausea, vomiting or diarrhea. No abdominal pain or blood.  GENITOURINARY: no burning with urination.   NEUROLOGICAL: As above   MUSCULOSKELETAL: As above   HEMATOLOGIC: Positive history of lymphoma  PSYCHIATRIC: Positive history of anxiety and depression  ENDOCRINOLOGIC: No reports of sweating, cold or heat intolerance. No polyuria or polydipsia.  ALLERGIES: No history of asthma, hives, eczema or

## 2024-01-01 NOTE — PROGRESS NOTES
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   Lumbar spine with masses. MRI cervical & thoracic spine today.   MRI brain ordered.  On Dex 4mg q6.  LP yesterday.   Neuro and neurosurgery following.         ROS:  Constitutional: Positive for bilat LE weakness. Negative for fever, chills, malaise, fatigue.  CV: Negative for chest pain, palpitations, edema.  Respiratory: Negative for dyspnea, cough, wheezing.  GI: Negative for nausea, abdominal pain, diarrhea.    10 point review of systems is otherwise negative with the exception of the elements mentioned above in the HPI.        No Known Allergies  Past Medical History:   Diagnosis Date    Anxiety     Depression     Hypercholesterolemia     Hypertension     Thyroid disease      Past Surgical History:   Procedure Laterality Date    US BREAST BIOPSY W LOC DEVICE 1ST LESION RIGHT Right 11/21/2022    US BREAST NEEDLE BIOPSY RIGHT 11/21/2022 René Pillai Jr., MD [facility]     Family History   Problem Relation Age of Onset    Breast Cancer Neg Hx      Social History     Socioeconomic History    Marital status:      Spouse name: Not on file    Number of children: Not on file    Years of education: Not on file    Highest education level: Not on file   Occupational History    Not on file   Tobacco Use    Smoking status: Never     Passive exposure: Past    Smokeless tobacco: Never   Vaping Use    Vaping Use: Never used   Substance and Sexual Activity    Alcohol use: No    Drug use: Never    Sexual activity: Not on file   Other Topics Concern    Not on file   Social History Narrative    Not on file     Social Determinants of Health     Financial Resource Strain: Low Risk  (3/21/2023)    Overall Financial Resource Strain (CARDIA)     Difficulty of Paying Living Expenses: Not hard at all   Food Insecurity: No Food Insecurity (12/30/2023)    Hunger Vital Sign      central aspect of the subarachnoid space arising from one of the  cauda equina nerve roots measures 4 x 3 mm on axial image 9 of series 4.  Enhancing mass arising from the right L4 nerve at the lateral recess and  extending into the right L4-5 neural foramen measures 14 x 7 x 19 mm (transverse  by AP by craniocaudal).  All of these masses are likely arising from the nerve  roots, with the largest mass on the left also possibly being a dural based mass.    At T12-L1, the central canal and neural foramina are patent.  Type I endplate  changes with minimal disc bulge.    At L1-2, degenerative retrolisthesis.  Moderate disc bulge.  Mild facet  hypertrophy and ligamentum flavum thickening.  Central canal and neural foramina  are patent.    At L2-3, the central canal and neural foramina are patent.  Mild to moderate  disc bulge.  Mild facet hypertrophy and ligamentum flavum thickening.    At L3-4, enhancing soft tissue mass filling the left foramen.  Mild to moderate  facet hypertrophy with mild ligamentum flavum thickening.  Mild disc bulge.  The  right foramen is patent.    At L4-5, enhancing soft tissue mass in the right foramen.  Mild to moderate disc  bulge.  Moderate facet apostrophe with mild ligamentum flavum thickening.  Mild  bilateral neural foraminal stenosis.    At L5-S1, moderate disc bulge.  Mild facet hypertrophy and mild to moderate  ligamentum flavum thickening.  Mild bilateral neural foraminal stenosis.  Central canal is patent.    Impression  1.  Solid enhancing masses arising from multiple nerve roots as described above.  Largest is located in the left L3-4 neural foramen and likely explains the  clinical symptoms.  Differential includes malignancy/metastatic disease and  multiple benign nerve sheath tumors.  Patient has a history of lymphoma, and  this could represent lymphoma.  Recommend lumbar puncture.  2.  Multilevel degenerative disease as described above, with mild degenerative  retrolisthesis  at L1-2 and L2-3.  No evidence of disc herniation or central  canal stenosis.    Findings discussed with MANE OLSON on 12/30/2023 2:58 PM.        ASSESSMENT:  78 years old female patient with medical problems including DLBCL completed 6 cycles of R mini CHOP in May 2023 (Dr. Melani Bryson) and CR based on posttreatment PET/CT in May 2023.  She is admitted with a UTI and bilateral lower extremity weakness.  MRI lumbar spine showed enhancing masses arising from multiple nerve roots.  Per Dr. John's discussion with , these masses have increased in size since prior CT on 12/7/2023.  She has had an LP with CSF analysis at [facility] in March 2023.  It was a low cellularity specimen but was negative by flow cytometry for immunophenotypic evidence of lymphoma.    Dr. John discussed the results of most recent labs and imaging findings with the patient.  Recurrent lymphoma is certainly a concern among other possibilities and based on neurologic deficit, we shall start her on dexamethasone 10 mg IV x 1 dose now and then 4 mg IV every 6 hours.  Neurosurgery consult.  We shall obtain MRIs of the cervical and thoracic spine to look for disease elsewhere.  She will undergo lumbar puncture hopefully today.  CSF will be sent for flow cytometry.  Check LDH, uric acid, beta 2 microglobulin. Transfer to [facility]. Discussed with Dr. Amador and Dr. Olson. Updated family. Total visit time 80 minutes.    PLAN:  DLBCL  -Diagnosed in 12/22. S/p R mini CHOP and CR on PET in May.     LE Weakness/loss of sensation  - MRI L spine with enhancing masses arising from multiple nerve roots.  - MRI brain, cervical spine and thoracic spine pending.  -Neurology and Neurosurgery following.   -LP performed yesterday.  -On Dex 4mg q6 hours.    Continue supportive care.   Lovenox for DVT/PE prophy.   PT/OT.      Dispo: TBD. Referral to see Dr. John placed as well as order for outpatient PET.        MERCEDES Frank - CNP  [facility] Hematology and

## 2024-01-01 NOTE — PROGRESS NOTES
If you need to see a   -  just ask the nurse to call us.    We look forward to serving your family!!        Chaplain Ko

## 2024-01-01 NOTE — PROGRESS NOTES
pt c/o weakness in BLE, saying (per daughter) It's worse today than when admitted. reports both numb and tingling in both legs evenly. Pt having difficulty with flexion of both feet, and extention is like a +1, weak .  Dr. Mac aware at 0555 and neurology consulted.

## 2024-01-01 NOTE — PROGRESS NOTES
Report given to Lissette BHATT, at bedside, face to face.  No further complaints voiced by pt/daughter.  Resting in bed, stable condition.

## 2024-01-01 NOTE — PROGRESS NOTES
Resting in bed.  Drsg to lower back clean, dry and intact from lumbar puncture site. daughter at bedside, who speaks english and is interpreting.   offer declined. Lab called spinal fluid glucose of 35 at 1945.  MD messaged via [employer].

## 2024-01-01 NOTE — PROGRESS NOTES
ACUTE OCCUPATIONAL THERAPY GOALS:   (Developed with and agreed upon by patient and/or caregiver.)  1. Pt will complete UB bathing and dressing with CGA using AE as needed  2. Pt will maintain sitting balance for ADLs with CGA  3. Pt will complete functional transfers for ADLs with max A  4. Pt will complete grooming with CGA  5. Pt will demonstrate independence with HEP to promote increased BUE strength and functional use for ADLs      Timeframe: 7 visits      OCCUPATIONAL THERAPY Initial Assessment and Daily Note       OT Visit Days: 1  Acknowledge Orders  Time  OT Charge Capture  Rehab Caseload Tracker      Imelda Coombs is a 78 y.o. 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 Senthil Coombs was admitted with back pain, LE weakness and parasthesia, and frequent falls due to hx of lymphoma now with lumbar spine involvement. Pt presented generally weak with severe BLE weakness and deficits in mobility, balance, and activity tolerance impacting ADLs. Pt required max A x2 for bed mobility via log roll and mod-max A for sitting balance. Total A for LB dressing and total A x2 to briefly stand and to maintain standing balance. Pt fearful and with poor activity tolerance overall. Pt presented below her functional baseline and would benefit from skilled OT services to address deficits. Recommend rehab at d/c.      [facility] AM-PAC™ “6 Clicks” Daily Activity Inpatient Short Form:    AM-PAC Daily Activity - Inpatient   How much help is needed for putting on and taking off regular lower    (Skilled intervention is medically necessary to address:)  Decreased ADL/Functional Activities  Decreased Activity Tolerance  Decreased Balance  Decreased Cognition  Decreased Safety Awareness  Decreased Strength  Decreased Transfer Abilities   INTERVENTIONS PLANNED:  (Benefits and precautions of occupational therapy have been discussed with the patient.)  Self Care Training  Therapeutic Activity  Therapeutic Exercise/HEP  Neuromuscular Re-education  Manual Therapy  Education         TREATMENT:     EVALUATION: HIGH COMPLEXITY: (Untimed Charge)    TREATMENT:   Co-Treatment PT/OT necessary due to patient's decreased overall endurance/tolerance levels, as well as need for high level skilled assistance to complete functional transfers/mobility and functional tasks  Self Care (10 minutes): Patient participated in lower body dressing and grooming ADLs in supported sitting and unsupported sitting with moderate verbal cueing to increase independence and decrease assistance required. Patient also participated in functional mobility and functional transfer training to increase independence, decrease assistance required, increase activity tolerance, and increase safety awareness.     TREATMENT GRID:  N/A    AFTER TREATMENT PRECAUTIONS: Bed, Call light within reach, Needs within reach, RN notified, and Visitors at bedside    INTERDISCIPLINARY COLLABORATION:  RN/ PCT    EDUCATION:  Education Given To: Patient;Family  Education Provided: Role of Therapy;Plan of Care    TOTAL TREATMENT DURATION AND TIME:  Time In: 0913  Time Out: 0937  Minutes: 24    Shelly Barton, OT

## 2024-01-01 NOTE — PROGRESS NOTES
ACUTE PHYSICAL THERAPY GOALS:   (Developed with and agreed upon by patient and/or caregiver.)    (1.) Imelda Coombs  will move from supine to sit and sit to supine  with MINIMAL ASSIST within 7 treatment day(s).    (2.) Imelda Coombs will transfer from bed to chair and chair to bed with MODERATE ASSIST using the least restrictive device within 7 treatment day(s).    (3.) Imelda Coombs will ambulate with MAXIMAL ASSIST for 10 feet with the least restrictive device within 7 treatment day(s).   (4.) Imelda Coombs will perform standing static and dynamic balance activities x 5 minutes with MODERATE ASSIST to improve safety within 7 treatment day(s).  (5.) Imelda Coombs will perform seated static and dynamic balance activities x 20 minutes with STAND BY ASSIST to improve safety within 7 treatment day(s).  (6.) Imelda Coombs will perform therapeutic exercises x 20 min for HEP with STAND BY ASSIST to improve strength, endurance, and functional mobility within 7 treatment day(s).       PHYSICAL THERAPY Initial Assessment, Daily Note, and AM  (Link to Caseload Tracking: PT Visit Days : 1  Acknowledge Orders  Time In/Out  PT Charge Capture  Rehab Caseload Tracker    Imelda Coombs is a 78 y.o. 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 Senthil Coombs  is a 78 year old F who presents with new onset of B LE weakness, back pain. PMHx includes lymphoma with involvement of lumbar spine  81.38  Mobility Inpatient CMS G-Code Modifier : CM    SUBJECTIVE:   Ms. Senthil Coombs states, \"no!\"     Social/Functional Lives With: Daughter  Type of Home: House  Home Layout: One level  Home Access: Stairs to enter without rails  Entrance Stairs - Number of Steps: 1  Home Equipment: Walker, rolling  Has the patient had two or more falls in the past year or any fall with injury in the past year?: Yes  ADL Assistance: Needs assistance  Ambulation Assistance: Needs assistance  Transfer Assistance: Needs assistance    OBJECTIVE:     PAIN: VITALS / O2: PRECAUTION / LINES / DRAINS:   Pre Treatment: B LE pain, does not quantify         Post Treatment: see above Vitals        Oxygen      IV    RESTRICTIONS/PRECAUTIONS:                    GROSS EVALUATION: B LE Intact Impaired (Comments):   AROM []  Decreased hip flexion, knee extension, ankle DF, L >R   PROM []    Strength []  Generally very weak, L > R, proximal>distal   Balance [] Posture: Fair  Sitting - Static: Fair  Sitting - Dynamic: Fair, -  Standing - Static: Poor   Posture [] N/A   Sensation []  Decreased light touch B LE   Coordination []      Tone []  Generally decreased   Edema []    Activity Tolerance [] Treatment limited secondary to decreased cognition, Patient limited by pain, Patient limited by fatigue    []      COGNITION/  PERCEPTION: Intact Impaired (Comments):   Orientation [x]     Vision [x]     Hearing []  Pala   Cognition  []  Tearful, anxious, fear of falling     MOBILITY: I Mod I S SBA CGA Min Mod Max Total  NT x2 Comments:   Bed Mobility    Rolling [] [] [] [] [] [] [] [x] [] [] [x]    Supine to Sit [] [] [] [] [] [] [] [x] [] [] [x]    Scooting [] [] [] [] [] [] [] [x] [] [] [x]    Sit to Supine [] [] [] [] [] [] [] [x] [] [] [x]    Transfers    Sit to Stand [] [] [] [] [] [] [] [x] [] [] [x]    Bed to Chair [] [] [] [] [] [] [] [] [] [x] []    Stand to Sit [] [] [] [] [] [] [] [x] [] [] [x]     [] [] [] [] [] [] [] [] [] [] []

## 2024-01-01 NOTE — PROGRESS NOTES
Hospitalist Progress Note   Admit Date:  2023  6:14 PM   Name:  Imelda Coombs   Age:  78 y.o.  Sex:  female  :  1945   MRN:  [id number]   Room:  [facility] [place]    Presenting/Chief Complaint: No chief complaint on file.     Reason(s) for Admission: spinal cord mass     Hospital Course:       Imelda Coombs is a 78 y.o. female with medical history of diffuse large B cell lymphoma treated  [facility], chronic pain, CKD, admitted with lower back pain to LE with LE weakness, falls.          CXR negative         CT Lspine\"  IMPRESSION:  No evidence of acute fracture or subluxation.     Degenerative changes.\"          MRI Lspine showing \"  IMPRESSION:  1.  Solid enhancing masses arising from multiple nerve roots as described above.   Largest is located in the left L3-4 neural foramen and likely explains the  clinical symptoms.  Differential includes malignancy/metastatic disease and  multiple benign nerve sheath tumors.  Patient has a history of lymphoma, and  this could represent lymphoma.  Recommend lumbar puncture.  2.  Multilevel degenerative disease as described above, with mild degenerative  retrolisthesis at L1-2 and L2-3.  No evidence of disc herniation or central  canal stenosis.  \"      MRI C and Tspine pending       Oncology consulted     S/p IR for LP  Low glucose  Elevated protein       Neurology consulted - I discussed with Dr. Reynoso       Has UTI  On rocephin course  Urine culture pending         Discharge plans pending   Lives with daughter       Subjective & 24hr Events:       Daughter at bedside  Ate some  Constipated   Has LE weakness worse  Lower back and LE pain improved with meds         Assessment & Plan:       LE weakness  Abnormal MRI Lspine  Large B cell lymphoma  Decadron 4 mg IV every 6 hours  Neurology - spoke with Edgardo Roberson who will review and consult if needed   neurosurgery consult per oncology, I spoke with Dr. Trinidad   Oncology following -  HYDROcodone-acetaminophen (NORCO) 5-325 MG per tablet 1 tablet  1 tablet Oral Q6H PRN    sodium chloride flush 0.9 % injection 5-40 mL  5-40 mL IntraVENous 2 times per day    sodium chloride flush 0.9 % injection 5-40 mL  5-40 mL IntraVENous PRN    0.9 % sodium chloride infusion   IntraVENous PRN    cefTRIAXone (ROCEPHIN) 2,000 mg in sodium chloride 0.9 % 50 mL IVPB (mini-bag)  2,000 mg IntraVENous Q12H       Signed:  Belen Bell MD    Part of this note may have been written by using a voice dictation software.  The note has been proof read but may still contain some grammatical/other typographical errors.

## 2024-01-01 NOTE — SIGNIFICANT EVENT
RN notified that CSF from LP shows glucose of 35.  Upon chart review, CSF protein is also elevated at 384.  Appears cell count and other studies are still pending.  Patient has been on rocephin, will increase dosage to 2g Q12H for empiric coverage for possible bacterial meningitis.

## 2024-01-01 NOTE — CONSULTS
Duplicate consult d/t transferring from [facility]. See Dr. John's note from 12/31.     Savannah Gomez, MERCEDES - CNP  [facility] Hematology and Oncology  [address] [place] [address]  Office : [phone]  Fax : [phone]

## 2024-01-01 NOTE — PROGRESS NOTES
NSR Contact Note    Called by hospitalist for pt with known B-cell lymphoma with lumbar nerve masses.  Neurology following with recent LP and suggests CNS involvement.  No report neurologic exam, though chart review documents longstanding LBP and BLE weakness since chemo in summer of 2023.  Followed by oncology at [facility], seen 12/15 with reported significant BLE weakness.    Imaging reviewed - MRI Lsp shows enhancement and enlargement of exting nerve roots at L L1, L3 and R L4 with potential epidural foraminal component at left L3/4.  No central canal compromise or cauda equina compression.  Some suggestion of cord signal changes vs artifact on partially visualized caudal Tsp.    No emergent NSR intervention warranted at this time.  Given concern for CNS disease, recommend imaging of neuraxis with MRI brain, C/Tsp c/s contrast.  No current role for surgical intervention, will have primary NSR team followup further imaging and discuss with oncology team for CNS access for treatment as indicated, vs management of other findings on imaging.    ADDENDUM:  Further imaging performed and available for review - MRI brain with L atrial enhancing lesion and MRI Tsp consistent with intradural enhancing lesion which appears contiguous with cord parenchyma on the left.  No emergent decompressive surgical indications at this time.  Will notify primary [facility] NSR team to discuss findings and options with pt upon their return tomorrow.

## 2024-01-02 LAB
ANION GAP SERPL CALC-SCNC: 8 MMOL/L (ref 2–11)
BASOPHILS # BLD: 0 K/UL (ref 0–0.2)
BASOPHILS NFR BLD: 0 % (ref 0–2)
BUN SERPL-MCNC: 26 MG/DL (ref 8–23)
CALCIUM SERPL-MCNC: 9.6 MG/DL (ref 8.3–10.4)
CHLORIDE SERPL-SCNC: 107 MMOL/L (ref 103–113)
CO2 SERPL-SCNC: 23 MMOL/L (ref 21–32)
CREAT SERPL-MCNC: 1.3 MG/DL (ref 0.6–1)
DIFFERENTIAL METHOD BLD: ABNORMAL
EOSINOPHIL # BLD: 0 K/UL (ref 0–0.8)
EOSINOPHIL NFR BLD: 0 % (ref 0.5–7.8)
ERYTHROCYTE [DISTWIDTH] IN BLOOD BY AUTOMATED COUNT: 14.1 % (ref 11.9–14.6)
GLUCOSE SERPL-MCNC: 147 MG/DL (ref 65–100)
HAV IGM SER QL: NONREACTIVE
HBV CORE IGM SER QL: NONREACTIVE
HBV SURFACE AG SER QL: NONREACTIVE
HCT VFR BLD AUTO: 41.2 % (ref 35.8–46.3)
HCV AB SER QL: NONREACTIVE
HGB BLD-MCNC: 14.2 G/DL (ref 11.7–15.4)
HIV 1+2 AB+HIV1 P24 AG SERPL QL IA: NONREACTIVE
HIV 1/2 RESULT COMMENT: NORMAL
IMM GRANULOCYTES # BLD AUTO: 0.1 K/UL (ref 0–0.5)
IMM GRANULOCYTES NFR BLD AUTO: 1 % (ref 0–5)
LYMPHOCYTES # BLD: 0.8 K/UL (ref 0.5–4.6)
LYMPHOCYTES NFR BLD: 13 % (ref 13–44)
MAGNESIUM SERPL-MCNC: 1.9 MG/DL (ref 1.8–2.4)
MCH RBC QN AUTO: 31.3 PG (ref 26.1–32.9)
MCHC RBC AUTO-ENTMCNC: 34.5 G/DL (ref 31.4–35)
MCV RBC AUTO: 90.7 FL (ref 82–102)
MONOCYTES # BLD: 0.2 K/UL (ref 0.1–1.3)
MONOCYTES NFR BLD: 3 % (ref 4–12)
NEUTS SEG # BLD: 4.8 K/UL (ref 1.7–8.2)
NEUTS SEG NFR BLD: 83 % (ref 43–78)
NRBC # BLD: 0 K/UL (ref 0–0.2)
PLATELET # BLD AUTO: 262 K/UL (ref 150–450)
PMV BLD AUTO: 9.6 FL (ref 9.4–12.3)
POTASSIUM SERPL-SCNC: 3.4 MMOL/L (ref 3.5–5.1)
RBC # BLD AUTO: 4.54 M/UL (ref 4.05–5.2)
SODIUM SERPL-SCNC: 138 MMOL/L (ref 136–146)
WBC # BLD AUTO: 5.8 K/UL (ref 4.3–11.1)

## 2024-01-02 PROCEDURE — 87389 HIV-1 AG W/HIV-1&-2 AB AG IA: CPT

## 2024-01-02 PROCEDURE — 2580000003 HC RX 258: Performed by: FAMILY MEDICINE

## 2024-01-02 PROCEDURE — 6360000002 HC RX W HCPCS: Performed by: INTERNAL MEDICINE

## 2024-01-02 PROCEDURE — 1100000000 HC RM PRIVATE

## 2024-01-02 PROCEDURE — 99232 SBSQ HOSP IP/OBS MODERATE 35: CPT | Performed by: INTERNAL MEDICINE

## 2024-01-02 PROCEDURE — 80048 BASIC METABOLIC PNL TOTAL CA: CPT

## 2024-01-02 PROCEDURE — 36415 COLL VENOUS BLD VENIPUNCTURE: CPT

## 2024-01-02 PROCEDURE — 6370000000 HC RX 637 (ALT 250 FOR IP): Performed by: INTERNAL MEDICINE

## 2024-01-02 PROCEDURE — 6370000000 HC RX 637 (ALT 250 FOR IP): Performed by: FAMILY MEDICINE

## 2024-01-02 PROCEDURE — 83735 ASSAY OF MAGNESIUM: CPT

## 2024-01-02 PROCEDURE — 6360000002 HC RX W HCPCS: Performed by: STUDENT IN AN ORGANIZED HEALTH CARE EDUCATION/TRAINING PROGRAM

## 2024-01-02 PROCEDURE — 2500000003 HC RX 250 WO HCPCS: Performed by: INTERNAL MEDICINE

## 2024-01-02 PROCEDURE — 85025 COMPLETE CBC W/AUTO DIFF WBC: CPT

## 2024-01-02 PROCEDURE — 2580000003 HC RX 258: Performed by: STUDENT IN AN ORGANIZED HEALTH CARE EDUCATION/TRAINING PROGRAM

## 2024-01-02 PROCEDURE — 80074 ACUTE HEPATITIS PANEL: CPT

## 2024-01-02 RX ORDER — MORPHINE SULFATE 2 MG/ML
1 INJECTION, SOLUTION INTRAMUSCULAR; INTRAVENOUS EVERY 4 HOURS PRN
Status: DISCONTINUED | OUTPATIENT
Start: 2024-01-02 | End: 2024-01-04 | Stop reason: HOSPADM

## 2024-01-02 RX ORDER — POTASSIUM CHLORIDE 20 MEQ/1
40 TABLET, EXTENDED RELEASE ORAL 2 TIMES DAILY WITH MEALS
Status: COMPLETED | OUTPATIENT
Start: 2024-01-02 | End: 2024-01-03

## 2024-01-02 RX ADMIN — DEXAMETHASONE SODIUM PHOSPHATE 4 MG: 4 INJECTION INTRA-ARTICULAR; INTRALESIONAL; INTRAMUSCULAR; INTRAVENOUS; SOFT TISSUE at 08:29

## 2024-01-02 RX ADMIN — DEXAMETHASONE SODIUM PHOSPHATE 4 MG: 4 INJECTION INTRA-ARTICULAR; INTRALESIONAL; INTRAMUSCULAR; INTRAVENOUS; SOFT TISSUE at 21:48

## 2024-01-02 RX ADMIN — DOCUSATE SODIUM 100 MG: 100 CAPSULE, LIQUID FILLED ORAL at 21:48

## 2024-01-02 RX ADMIN — LEVOTHYROXINE SODIUM 75 MCG: 0.07 TABLET ORAL at 08:29

## 2024-01-02 RX ADMIN — SODIUM CHLORIDE, PRESERVATIVE FREE 10 ML: 5 INJECTION INTRAVENOUS at 22:02

## 2024-01-02 RX ADMIN — PANTOPRAZOLE SODIUM 40 MG: 40 TABLET, DELAYED RELEASE ORAL at 05:15

## 2024-01-02 RX ADMIN — AMITRIPTYLINE HYDROCHLORIDE 25 MG: 25 TABLET, FILM COATED ORAL at 08:30

## 2024-01-02 RX ADMIN — CEFTRIAXONE SODIUM 2000 MG: 2 INJECTION, POWDER, FOR SOLUTION INTRAMUSCULAR; INTRAVENOUS at 05:15

## 2024-01-02 RX ADMIN — SERTRALINE 100 MG: 100 TABLET, FILM COATED ORAL at 08:29

## 2024-01-02 RX ADMIN — SODIUM CHLORIDE: 9 INJECTION, SOLUTION INTRAVENOUS at 05:12

## 2024-01-02 RX ADMIN — TUBERCULIN PURIFIED PROTEIN DERIVATIVE 5 UNITS: 5 INJECTION, SOLUTION INTRADERMAL at 11:21

## 2024-01-02 RX ADMIN — ATORVASTATIN CALCIUM 40 MG: 40 TABLET, FILM COATED ORAL at 08:30

## 2024-01-02 RX ADMIN — DEXAMETHASONE SODIUM PHOSPHATE 4 MG: 4 INJECTION INTRA-ARTICULAR; INTRALESIONAL; INTRAMUSCULAR; INTRAVENOUS; SOFT TISSUE at 16:05

## 2024-01-02 RX ADMIN — AMLODIPINE BESYLATE 5 MG: 5 TABLET ORAL at 08:29

## 2024-01-02 RX ADMIN — SUCRALFATE 1 G: 1 TABLET ORAL at 21:48

## 2024-01-02 RX ADMIN — POTASSIUM CHLORIDE 40 MEQ: 1500 TABLET, EXTENDED RELEASE ORAL at 16:05

## 2024-01-02 RX ADMIN — CEFTRIAXONE SODIUM 2000 MG: 2 INJECTION, POWDER, FOR SOLUTION INTRAMUSCULAR; INTRAVENOUS at 17:46

## 2024-01-02 RX ADMIN — SUCRALFATE 1 G: 1 TABLET ORAL at 18:53

## 2024-01-02 RX ADMIN — DEXAMETHASONE SODIUM PHOSPHATE 4 MG: 4 INJECTION INTRA-ARTICULAR; INTRALESIONAL; INTRAMUSCULAR; INTRAVENOUS; SOFT TISSUE at 03:20

## 2024-01-02 RX ADMIN — SUCRALFATE 1 G: 1 TABLET ORAL at 08:29

## 2024-01-02 RX ADMIN — HYDROCODONE BITARTRATE AND ACETAMINOPHEN 1 TABLET: 5; 325 TABLET ORAL at 18:20

## 2024-01-02 RX ADMIN — TRAZODONE HYDROCHLORIDE 50 MG: 50 TABLET ORAL at 21:48

## 2024-01-02 ASSESSMENT — PAIN SCALES - GENERAL: PAINLEVEL_OUTOF10: 5

## 2024-01-02 ASSESSMENT — PAIN DESCRIPTION - LOCATION: LOCATION: KNEE

## 2024-01-02 ASSESSMENT — PAIN DESCRIPTION - ORIENTATION: ORIENTATION: LEFT;RIGHT

## 2024-01-02 ASSESSMENT — PAIN DESCRIPTION - DESCRIPTORS: DESCRIPTORS: ACHING;SORE

## 2024-01-02 NOTE — PROGRESS NOTES
Hospitalist Progress Note   Admit Date:  2023  6:14 PM   Name:  Imelda Coombs   Age:  78 y.o.  Sex:  female  :  1945   MRN:  [id number]   Room:  [facility]    Presenting/Chief Complaint: No chief complaint on file.     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      In summary, Imelda Coombs is a 78 y.o. female with medical history significant for diffused large B cell lymphoma (treated 2023 @ [facility]) , chronic pain, CKD who was admitted for lower back pain w/ LE weakness and falls.   CT L spine and MRI L spine showed solid enhancing masses arising from multiple nerve roots as well as multilevel degenerative disease.  Workup also revealed urinary tract infection and Rocephin was started.  MRI Brain with solid enhancing intraventricular mass involving the left lateral ventricle.      Subjective/24 hr Events (24) :  Patient is seen and examined at bedside.  No acute events reported overnight by nursing staff.  Patient daughter is at bedside.  Continues to complain of lower extremity weakness and not much of the pain.  Fear of falling.  Reports that she has not had any bowel movement in past several days.    Reports that she is uncomfortable with using the bedpan.    Patient denies fever, chills, chest pains, shortness of breath, n/v, abdominal pain.    Review of Systems: 10 point review of systems is otherwise negative with the exception of the elements mentioned above.        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  - MRI C and T spine pending  - Neurosurgery recs appreciated. No neurosurgical intervention needed. Recommending radiation  - Oncology recs appreciated. Discussed with Oncology. Recommending PET scan and possible intra-thecal treatment.  - c/w with decadron 4mg q6h  DETECTED NOTDET      Neisseria meningitidis CSF by PCR NOT DETECTED NOTDET      Streptococcus agalactiae CSF by PCR NOT DETECTED NOTDET      Streptococcus pneumoniae CSF by PCR NOT DETECTED NOTDET      Cytomegalovirus CSF by PCR NOT DETECTED NOTDET      Enterovirus CSF by PCR NOT DETECTED NOTDET      Herpes simplex virus 1 CSF by PCR NOT DETECTED NOTDET      Herpes simplex virus 2 CSF by PCR NOT DETECTED NOTDET      Human herpesvirus 6 CSF by PCR NOT DETECTED NOTDET      Human parechovirus CSF by PCR NOT DETECTED NOTDET      Varicella zoster virus CSF by PCR NOT DETECTED NOTDET      Cryptococcus neoformans/gattii CSF by PCR NOT DETECTED NOTDET     Basic Metabolic Panel w/ Reflex to MG    Collection Time: 01/01/24  6:26 AM   Result Value Ref Range    Sodium 137 136 - 146 mmol/L    Potassium 3.6 3.5 - 5.1 mmol/L    Chloride 110 103 - 113 mmol/L    CO2 21 21 - 32 mmol/L    Anion Gap 6 2 - 11 mmol/L    Glucose 143 (H) 65 - 100 mg/dL    BUN 22 8 - 23 MG/DL    Creatinine 1.10 (H) 0.6 - 1.0 MG/DL    Est, Glom Filt Rate 52 (L) >60 ml/min/1.73m2    Calcium 9.3 8.3 - 10.4 MG/DL   CBC with Auto Differential    Collection Time: 01/01/24  6:26 AM   Result Value Ref Range    WBC 5.0 4.3 - 11.1 K/uL    RBC 4.43 4.05 - 5.2 M/uL    Hemoglobin 13.8 11.7 - 15.4 g/dL    Hematocrit 40.7 35.8 - 46.3 %    MCV 91.9 82 - 102 FL    MCH 31.2 26.1 - 32.9 PG    MCHC 33.9 31.4 - 35.0 g/dL    RDW 13.9 11.9 - 14.6 %    Platelets 235 150 - 450 K/uL    MPV 9.7 9.4 - 12.3 FL    nRBC 0.00 0.0 - 0.2 K/uL    Differential Type AUTOMATED      Neutrophils % 75 43 - 78 %    Lymphocytes % 23 13 - 44 %    Monocytes % 2 (L) 4.0 - 12.0 %    Eosinophils % 0 (L) 0.5 - 7.8 %    Basophils % 0 0.0 - 2.0 %    Immature Granulocytes 0 0.0 - 5.0 %    Neutrophils Absolute 3.8 1.7 - 8.2 K/UL    Lymphocytes Absolute 1.2 0.5 - 4.6 K/UL    Monocytes Absolute 0.1 0.1 - 1.3 K/UL    Eosinophils Absolute 0.0 0.0 - 0.8 K/UL    Basophils Absolute 0.0 0.0 - 0.2 K/UL    Absolute

## 2024-01-02 NOTE — CARE COORDINATION
Accessible   Home Equipment Walker, rolling   Receives Help From Family   ADL Assistance Needs assistance   Bath     Dressing     Grooming     Feeding     Toileting     Homemaking Assistance     Meal Prep     Laundry     Vacuuming     Cleaning     Gardening     Yard Work     Driving     Shopping          Other (Comment)     Homemaking Responsibilities     Meal Prep Responsibility     Laundry Responsibility     Cleaning Responsibility     Bill Paying/Finance Responsibility     Shopping Responsibility     Dependent Care Responsibility     Health Care Management     Other (Comment)     Ambuation Assistance Needs assistance   Transfer Assisstance Needs assistance   Active  No   Patient's  Info Family   Mode of Transportation Family   Education     Occupation Unemployed   Type of Occupation       Discharge Planning   Type of Residence Other (Comment) (Home vs STR)   Living Arrangements Children   Support Systems Children, Family Members   Current Services Prior To Admission Durable Medical Equipment   Potential Assistance Needed Skilled Nursing Facility   DME Walker   DME     DME Ordered? No   Potential Assistance Purchasing Medications No   Meds-to-Beds: Does the patient want to have any new prescriptions delivered to bedside prior to discharge?     Type of Home Care Services None   Patient expects to be discharged to: Other (comment) (Home vs STR)   Follow Up Appointment: Best Day/Time     One/Two Story Residence: One story   # of Interior Steps     Height of Each Step (in)     Interior Rails     Lift Chair Available     History of Falls? Yes     Services At/After Discharge  Transition of Care Consult (CM Consult): Discharge Planning   Internal Home Health     Internal Hospice     Reason Outside Agency Chosen     Partner SNF     Reason Why Partner SNF Not Chosen     Internal Comfort Care     Reason Outside Comfort Care Chosen     Services At/After Discharge     [place] Resource Information  Provided? No   Mode of Transport at Discharge Other (see comment) (TBD based on clinical course)   Hospital Transport Time of Discharge     Confirm Follow Up Transport Other (see comment) (TBD based on clinical course)     Condition of Participation: Discharge Planning  The plan for Transition of Care is related to the following treatment goals: Pt to obtain care to become medically stable and to return with a safe transition.   The Patient and/or Patient Representative was provided with a Choice of Provider? Patient, Patient Representative   Name of the Patient Representative who was provided with the Choice of Provider and agrees with the Discharge Plan? Daughter: Vida Edgar   The Patient and/or Patient Representative Agree with the Discharge Plan?     Freedom of Choice list was provided with basic dialogue that supports the individualized plan of care/goals, treatment preferences, and shares the quality data associated with the providers? Yes     Documentation for Discharge Appeal  Discharge Appealed by     Date notified by QIO of appeal request:     Time notified by QIO of appeal request:     Detailed Notice of Discharge given to:     Date Notice of Discharge given:     Time Notice of Discharge given:     Date records sent to QIO     Time records sent to QIO     Date Notified of Outcome     Time Notified of Outcome     Outcome of appeal       79 y/o female pt who was transferred from [facility] on 12/31 for progression of care.  Please see initial CM note for assessment information.  PT/OT have evaluated pt and recommend STR at d/c.  Pt has had recent falls at home and is weak.  Oncology and neurology are following.    MRI lumbar spine:  \"1.  Solid enhancing masses arising from multiple nerve roots as described above.  Largest is located in the left L3-4 neural foramen and likely explains the clinical symptoms.  Differential includes malignancy/metastatic disease and  multiple benign nerve sheath tumors. Patient  has a history of lymphoma, and this could represent lymphoma.  Recommend lumbar puncture.  2.  Multilevel degenerative disease as described above, with mild degenerative  retrolisthesis at L1-2 and L2-3.  No evidence of disc herniation or central  canal stenosis.\"    MRI Brain:  \"1.  Solid enhancing intraventricular mass involving the left lateral ventricle, compatible with neoplasm.  Given clinical history and recent lumbar spine imaging findings, this is suspicious for intraventricular CNS lymphoma.  2.  No evidence of infarct or hemorrhage.\"    12/31: LP completed.    PT/OT have evaluated pt and recommend STR at d/c.  CM met with pt and her granddaughter Anastasia at the bedside to discuss d/c planning.  Pt is nearly deaf and per the granddaughter the  machine and telephone are of no use to pt as she cannot hear.  Pt does not communicate with ASL.  CM explained therapy's recommendation of STR.  Anastasia stated she will need to discuss with her mother and aunt today before a decision is made.  Anastasia voiced concern that pt has Dx of depression and \"if she is left alone for too long she becomes very depressed.\"  CM explained options for in-home care such as HH and palliative care.  Anastasia inquired about a hospital bed and wheelchair.  CM will speak with family later today to see if they have decided to send pt to STR or if they wish to d/c home.  CM will continue to follow.  LOS = 3 days    GONZALO Davison 01/02/24 9:49 AM

## 2024-01-02 NOTE — ACP (ADVANCE CARE PLANNING)
Advance Care Planning     General Advance Care Planning (ACP) Conversation    Date of Conversation: 12/31/2023  Conducted with: Patient with Decision Making Capacity   Healthcare Decision Maker: Next of Kin by law (only applies in absence of above) (name) Vida Edgar    Healthcare Decision Maker:    Primary Decision Maker: Vida Edgar - Child - [phone]    Secondary Decision Maker: Anastasia Ortiz - Grandchild - [phone]  Click here to complete Healthcare Decision Makers including selection of the Healthcare Decision Maker Relationship (ie \"Primary\").   Today we documented Decision Maker(s) consistent with Legal Next of Kin hierarchy.    Content/Action Overview:  Has NO ACP documents/care preferences - refer to ACP Clinical Specialist  Reviewed DNR/DNI and patient elects Full Code (Attempt Resuscitation)  treatment goals, hospitalization preferences, and resuscitation preferences  No ACP documents on file.  Full Code per physician order.    Length of Voluntary ACP Conversation in minutes:  <16 minutes (Non-Billable)    GONZALO Davison

## 2024-01-02 NOTE — CONSULTS
NEUROSURGERY CONSULT NOTE:     CC: brain, thoracic spinal cord, and lumbar cancerous lesions with history of lymphoma    Assessment and Plan:   -no emergent neurosurgical intervention needed  -neurosurgical interventions would be non curative  -may be able to get a tissue biopsy for cancer confirmation if this would affect treatment  -Alejandro may be able to de-bulk thoracic lesion to allow for spinal swelling with radiation  -consider radiation treatment    HPI:   Imelda Coombs78 y.o. female with a PMH of lymphoma being treated at [facility], HTN, and thyroid disease who presents after being unable to use her legs for 3 weeks.  With the use of the  patient and family state that she is been unable to use her legs for 3 weeks and that she does have a history of cancer.  The MRI brain reveals a mass near the posterior left ventricle horn, a thoracic cancerous lesion, as well as a lumbar cancerous lesion with enhancement and enlargement of exiting nerve roots at L1, L3, and right L4 with potential epidural foraminal component at the left at L3-4.  At this time no emergent neurosurgical interventions are needed given that the patient's symptoms have been present for longer than 48 hours most likely patient will never regain the use of her legs.  In discussion with Dr. Allen patient may be eligible for a biopsy to confirm the above diagnosis of lymphoma.  There may be a chance that the thoracic lesion can be debulked and have a laminectomy in order to allow for swelling that would ultimately occur with radiation treatment.    Past Medical History:   Diagnosis Date    Anxiety     Depression     Hypercholesterolemia     Hypertension     Thyroid disease      Past Surgical History:   Procedure Laterality Date    US BREAST BIOPSY W LOC DEVICE 1ST LESION RIGHT Right 11/21/2022    US BREAST NEEDLE BIOPSY RIGHT 11/21/2022 René Pillai Jr., MD [facility]     No Known Allergies  Social History

## 2024-01-02 NOTE — PROGRESS NOTES
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 Negative for fever, chills, malaise, fatigue.  CV: Negative for chest pain, palpitations, edema.  Respiratory: Negative for dyspnea, cough, wheezing.  GI: Negative for nausea, abdominal pain, diarrhea.    10 point review of systems is otherwise negative with the exception of the elements mentioned above in the HPI.        No Known Allergies  Past Medical History:   Diagnosis Date    Anxiety     Depression     Hypercholesterolemia     Hypertension     Thyroid disease      Past Surgical History:   Procedure Laterality Date    US BREAST BIOPSY W LOC DEVICE 1ST LESION RIGHT Right 11/21/2022    US BREAST NEEDLE BIOPSY RIGHT 11/21/2022 René Pillai Jr., MD [facility]     Family History   Problem Relation Age of Onset    Breast Cancer Neg Hx      Social History     Socioeconomic History    Marital status:      Spouse name: Not on file    Number of children: Not on file    Years of education: Not on file    Highest education level: Not on file   Occupational History    Not on file   Tobacco Use    Smoking status: Never     Passive exposure: Past    Smokeless tobacco: Never   Vaping Use    Vaping Use: Never used   Substance and Sexual Activity    Alcohol use: No    Drug use: Never    Sexual activity: Not on file   Other Topics Concern    Not on file   Social History Narrative    Not on file     Social Determinants of Health     Financial Resource Strain: Low Risk  (3/21/2023)    Overall Financial Resource Strain (CARDIA)     Difficulty of Paying Living Expenses: Not hard at all   Food Insecurity: No Food Insecurity (12/30/2023)    Hunger Vital Sign      sulci are normal in size and shape.  There is a solid enhancing  elongated mass in the left lateral ventricle atrium/occipital horn measuring 1.4  x 1.1 x 2.6 cm (AP by transverse by craniocaudal) this is located in the region  of the choroid plexus.  No other enhancing brain mass visualized.    Gray white differentiation preserved.  No restricted diffusion or intracranial  hemorrhage.  Major intracranial flow voids are preserved.  Orbits are normal.  White matter signal is normal.  Paranasal sinuses are clear.  Nonspecific  moderate left mastoid effusion.    Impression  1.  Solid enhancing intraventricular mass involving the left lateral ventricle,  compatible with neoplasm.  Given clinical history and recent lumbar spine  imaging findings, this is suspicious for intraventricular CNS lymphoma.  2.  No evidence of infarct or hemorrhage.        ASSESSMENT:  78 years old female patient with medical problems including DLBCL completed 6 cycles of R mini CHOP in May 2023 (Dr. Melani Bryson) and CR based on posttreatment PET/CT in May 2023.  She is admitted with a UTI and bilateral lower extremity weakness.  MRI lumbar spine showed enhancing masses arising from multiple nerve roots.  Per Dr. John's discussion with , these masses have increased in size since prior CT on 12/7/2023.  She has had an LP with CSF analysis at [facility] in March 2023.  It was a low cellularity specimen but was negative by flow cytometry for immunophenotypic evidence of lymphoma.    Dr. John discussed the results of most recent labs and imaging findings with the patient.  Recurrent lymphoma is certainly a concern among other possibilities and based on neurologic deficit, we shall start her on dexamethasone 10 mg IV x 1 dose now and then 4 mg IV every 6 hours.  Neurosurgery consult.  We shall obtain MRIs of the cervical and thoracic spine to look for disease elsewhere.  She will undergo lumbar puncture hopefully today.  CSF will be sent

## 2024-01-03 ENCOUNTER — APPOINTMENT (OUTPATIENT)
Dept: GENERAL RADIOLOGY | Age: 79
DRG: 841 | End: 2024-01-03
Attending: STUDENT IN AN ORGANIZED HEALTH CARE EDUCATION/TRAINING PROGRAM
Payer: MEDICARE

## 2024-01-03 DIAGNOSIS — C83.30 DIFFUSE LARGE B-CELL LYMPHOMA, UNSPECIFIED BODY REGION (HCC): Primary | ICD-10-CM

## 2024-01-03 PROBLEM — C85.89 CNS LYMPHOMA (HCC): Status: ACTIVE | Noted: 2024-01-03

## 2024-01-03 LAB
ANION GAP SERPL CALC-SCNC: 7 MMOL/L (ref 2–11)
BUN SERPL-MCNC: 29 MG/DL (ref 8–23)
CALCIUM SERPL-MCNC: 9.1 MG/DL (ref 8.3–10.4)
CHLORIDE SERPL-SCNC: 107 MMOL/L (ref 103–113)
CO2 SERPL-SCNC: 23 MMOL/L (ref 21–32)
CREAT SERPL-MCNC: 1.1 MG/DL (ref 0.6–1)
EKG ATRIAL RATE: 101 BPM
EKG DIAGNOSIS: NORMAL
EKG P AXIS: 55 DEGREES
EKG P-R INTERVAL: 150 MS
EKG Q-T INTERVAL: 310 MS
EKG QRS DURATION: 70 MS
EKG QTC CALCULATION (BAZETT): 401 MS
EKG R AXIS: 7 DEGREES
EKG T AXIS: 43 DEGREES
EKG VENTRICULAR RATE: 101 BPM
GLUCOSE SERPL-MCNC: 138 MG/DL (ref 65–100)
LDH SERPL L TO P-CCNC: 85 IU/L
MM INDURATION, POC: 0 MM (ref 0–5)
POTASSIUM SERPL-SCNC: 3.9 MMOL/L (ref 3.5–5.1)
PPD, POC: NEGATIVE
SODIUM SERPL-SCNC: 137 MMOL/L (ref 136–146)
SPECIMEN SOURCE: NORMAL

## 2024-01-03 PROCEDURE — 36415 COLL VENOUS BLD VENIPUNCTURE: CPT

## 2024-01-03 PROCEDURE — 1100000000 HC RM PRIVATE

## 2024-01-03 PROCEDURE — 71045 X-RAY EXAM CHEST 1 VIEW: CPT

## 2024-01-03 PROCEDURE — 6360000002 HC RX W HCPCS: Performed by: INTERNAL MEDICINE

## 2024-01-03 PROCEDURE — 93005 ELECTROCARDIOGRAM TRACING: CPT | Performed by: INTERNAL MEDICINE

## 2024-01-03 PROCEDURE — 6370000000 HC RX 637 (ALT 250 FOR IP): Performed by: FAMILY MEDICINE

## 2024-01-03 PROCEDURE — 97530 THERAPEUTIC ACTIVITIES: CPT

## 2024-01-03 PROCEDURE — 2580000003 HC RX 258: Performed by: STUDENT IN AN ORGANIZED HEALTH CARE EDUCATION/TRAINING PROGRAM

## 2024-01-03 PROCEDURE — 6370000000 HC RX 637 (ALT 250 FOR IP): Performed by: INTERNAL MEDICINE

## 2024-01-03 PROCEDURE — 80048 BASIC METABOLIC PNL TOTAL CA: CPT

## 2024-01-03 PROCEDURE — 6360000002 HC RX W HCPCS: Performed by: FAMILY MEDICINE

## 2024-01-03 PROCEDURE — 97112 NEUROMUSCULAR REEDUCATION: CPT

## 2024-01-03 PROCEDURE — 6360000002 HC RX W HCPCS: Performed by: STUDENT IN AN ORGANIZED HEALTH CARE EDUCATION/TRAINING PROGRAM

## 2024-01-03 PROCEDURE — 2580000003 HC RX 258: Performed by: FAMILY MEDICINE

## 2024-01-03 RX ORDER — LIDOCAINE 4 G/G
1 PATCH TOPICAL DAILY
Status: DISCONTINUED | OUTPATIENT
Start: 2024-01-03 | End: 2024-01-04 | Stop reason: HOSPADM

## 2024-01-03 RX ADMIN — AMLODIPINE BESYLATE 5 MG: 5 TABLET ORAL at 08:13

## 2024-01-03 RX ADMIN — SODIUM CHLORIDE, PRESERVATIVE FREE 10 ML: 5 INJECTION INTRAVENOUS at 22:01

## 2024-01-03 RX ADMIN — SUCRALFATE 1 G: 1 TABLET ORAL at 21:51

## 2024-01-03 RX ADMIN — SERTRALINE 100 MG: 100 TABLET, FILM COATED ORAL at 08:13

## 2024-01-03 RX ADMIN — SODIUM CHLORIDE, PRESERVATIVE FREE 10 ML: 5 INJECTION INTRAVENOUS at 08:14

## 2024-01-03 RX ADMIN — ALPRAZOLAM 0.5 MG: 0.5 TABLET ORAL at 11:23

## 2024-01-03 RX ADMIN — ATORVASTATIN CALCIUM 40 MG: 40 TABLET, FILM COATED ORAL at 08:13

## 2024-01-03 RX ADMIN — HYDROCODONE BITARTRATE AND ACETAMINOPHEN 1 TABLET: 5; 325 TABLET ORAL at 22:06

## 2024-01-03 RX ADMIN — HYDROCODONE BITARTRATE AND ACETAMINOPHEN 1 TABLET: 5; 325 TABLET ORAL at 11:23

## 2024-01-03 RX ADMIN — FLUTICASONE PROPIONATE 2 SPRAY: 50 SPRAY, METERED NASAL at 08:13

## 2024-01-03 RX ADMIN — DEXAMETHASONE SODIUM PHOSPHATE 4 MG: 4 INJECTION INTRA-ARTICULAR; INTRALESIONAL; INTRAMUSCULAR; INTRAVENOUS; SOFT TISSUE at 04:33

## 2024-01-03 RX ADMIN — CEFTRIAXONE SODIUM 2000 MG: 2 INJECTION, POWDER, FOR SOLUTION INTRAMUSCULAR; INTRAVENOUS at 16:13

## 2024-01-03 RX ADMIN — DEXAMETHASONE SODIUM PHOSPHATE 4 MG: 4 INJECTION INTRA-ARTICULAR; INTRALESIONAL; INTRAMUSCULAR; INTRAVENOUS; SOFT TISSUE at 21:51

## 2024-01-03 RX ADMIN — SUCRALFATE 1 G: 1 TABLET ORAL at 11:23

## 2024-01-03 RX ADMIN — POLYETHYLENE GLYCOL 3350 17 G: 17 POWDER, FOR SOLUTION ORAL at 08:13

## 2024-01-03 RX ADMIN — AMITRIPTYLINE HYDROCHLORIDE 25 MG: 25 TABLET, FILM COATED ORAL at 08:13

## 2024-01-03 RX ADMIN — TRAZODONE HYDROCHLORIDE 50 MG: 50 TABLET ORAL at 21:51

## 2024-01-03 RX ADMIN — POTASSIUM CHLORIDE 40 MEQ: 1500 TABLET, EXTENDED RELEASE ORAL at 08:13

## 2024-01-03 RX ADMIN — CEFTRIAXONE SODIUM 2000 MG: 2 INJECTION, POWDER, FOR SOLUTION INTRAMUSCULAR; INTRAVENOUS at 04:33

## 2024-01-03 RX ADMIN — SUCRALFATE 1 G: 1 TABLET ORAL at 16:00

## 2024-01-03 RX ADMIN — SUCRALFATE 1 G: 1 TABLET ORAL at 08:13

## 2024-01-03 RX ADMIN — SODIUM CHLORIDE, PRESERVATIVE FREE 10 ML: 5 INJECTION INTRAVENOUS at 21:51

## 2024-01-03 RX ADMIN — DEXAMETHASONE SODIUM PHOSPHATE 4 MG: 4 INJECTION INTRA-ARTICULAR; INTRALESIONAL; INTRAMUSCULAR; INTRAVENOUS; SOFT TISSUE at 15:47

## 2024-01-03 RX ADMIN — DOCUSATE SODIUM 100 MG: 100 CAPSULE, LIQUID FILLED ORAL at 08:13

## 2024-01-03 RX ADMIN — ENOXAPARIN SODIUM 40 MG: 100 INJECTION SUBCUTANEOUS at 08:14

## 2024-01-03 RX ADMIN — LEVOTHYROXINE SODIUM 75 MCG: 0.07 TABLET ORAL at 08:13

## 2024-01-03 RX ADMIN — DOCUSATE SODIUM 100 MG: 100 CAPSULE, LIQUID FILLED ORAL at 21:51

## 2024-01-03 RX ADMIN — DEXAMETHASONE SODIUM PHOSPHATE 4 MG: 4 INJECTION INTRA-ARTICULAR; INTRALESIONAL; INTRAMUSCULAR; INTRAVENOUS; SOFT TISSUE at 08:13

## 2024-01-03 RX ADMIN — PANTOPRAZOLE SODIUM 40 MG: 40 TABLET, DELAYED RELEASE ORAL at 05:34

## 2024-01-03 ASSESSMENT — PAIN DESCRIPTION - ORIENTATION
ORIENTATION: LEFT

## 2024-01-03 ASSESSMENT — PAIN - FUNCTIONAL ASSESSMENT
PAIN_FUNCTIONAL_ASSESSMENT: PREVENTS OR INTERFERES SOME ACTIVE ACTIVITIES AND ADLS
PAIN_FUNCTIONAL_ASSESSMENT: PREVENTS OR INTERFERES SOME ACTIVE ACTIVITIES AND ADLS

## 2024-01-03 ASSESSMENT — PAIN DESCRIPTION - LOCATION
LOCATION: KNEE
LOCATION: CHEST
LOCATION: KNEE
LOCATION: OTHER (COMMENT)

## 2024-01-03 ASSESSMENT — PAIN DESCRIPTION - DESCRIPTORS
DESCRIPTORS: ACHING;SORE
DESCRIPTORS: ACHING;DISCOMFORT
DESCRIPTORS: SORE

## 2024-01-03 ASSESSMENT — PAIN DESCRIPTION - FREQUENCY: FREQUENCY: INTERMITTENT

## 2024-01-03 ASSESSMENT — PAIN SCALES - GENERAL
PAINLEVEL_OUTOF10: 8
PAINLEVEL_OUTOF10: 0
PAINLEVEL_OUTOF10: 7
PAINLEVEL_OUTOF10: 3
PAINLEVEL_OUTOF10: 4

## 2024-01-03 ASSESSMENT — PAIN DESCRIPTION - PAIN TYPE: TYPE: ACUTE PAIN

## 2024-01-03 ASSESSMENT — PAIN DESCRIPTION - ONSET: ONSET: GRADUAL

## 2024-01-03 NOTE — PROGRESS NOTES
ACUTE PHYSICAL THERAPY GOALS:   (Developed with and agreed upon by patient and/or caregiver.)    (1.) Imelda Coombs  will move from supine to sit and sit to supine  with MINIMAL ASSIST within 7 treatment day(s).    (2.) Imelda Coombs will transfer from bed to chair and chair to bed with MODERATE ASSIST using the least restrictive device within 7 treatment day(s).    (3.) Imelda Coombs will ambulate with MAXIMAL ASSIST for 10 feet with the least restrictive device within 7 treatment day(s).   (4.) Imelda Coombs will perform standing static and dynamic balance activities x 5 minutes with MODERATE ASSIST to improve safety within 7 treatment day(s).  (5.) Imelda Coombs will perform seated static and dynamic balance activities x 20 minutes with STAND BY ASSIST to improve safety within 7 treatment day(s).  (6.) Imelda Coombs will perform therapeutic exercises x 20 min for HEP with STAND BY ASSIST to improve strength, endurance, and functional mobility within 7 treatment day(s).     PHYSICAL THERAPY: Daily Note AM   (Link to Caseload Tracking: PT Visit Days : 2  Time In/Out PT Charge Capture  Rehab Caseload Tracker  Orders    Imelda Coombs is a 78 y.o. 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 MEDICARE / Plan: Spaces 2 [employer] DUAL COMPLETE / Product Type: *No Product type* /     ASSESSMENT:     REHAB RECOMMENDATIONS:   Recommendation to date pending progress:  Setting:  Short-term Rehab    Equipment:    To Be Determined     ASSESSMENT:  Ms. Senthil Coombs was received supine in bed, agreeable to therapy with education and encouragement. Initially used family members to interpret, however, this proved difficult so iPad  was used. Pt required maxA of 2 to sit at EOB. She was very fearful of falling so it was difficult to get her to get her feet on the

## 2024-01-03 NOTE — PROGRESS NOTES
Hospitalist Progress Note   Admit Date:  2023  6:14 PM   Name:  Imelda Coombs   Age:  78 y.o.  Sex:  female  :  1945   MRN:  [id number]   Room:  [facility]    Presenting/Chief Complaint: No chief complaint on file.     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      In summary, Imelda Coombs is a 78 y.o. female with medical history significant for diffused large B cell lymphoma (treated 2023 @ [facility]) , chronic pain, CKD who was admitted for lower back pain w/ LE weakness and falls.   CT L spine and MRI L spine showed solid enhancing masses arising from multiple nerve roots as well as multilevel degenerative disease.  Workup also revealed urinary tract infection and Rocephin was started.  MRI Brain with solid enhancing intraventricular mass involving the left lateral ventricle.    Neurosurgery was consulted and not recommending any no emergent neurosurgical interventions are needed given that the patient's symptoms have been present for longer than 48 hours most likely patient will never regain the use of her legs.   They felt that patient may be eligible for a biopsy to confirm diagnosis of lymphoma as well as possibility that the thoracic lesion can be debulked and had laminectomy in order to allow for swelling that would occur with radiation treatment.    Oncology is recommending PET scan outpatient prior to therapy that is directed to CNS relapse.        Subjective/24 hr Events (24) :  Patient is seen and examined at bedside.  No acute events reported overnight by nursing staff.  Patient was noted to be dizzy this morning with saturation of 89% on room air.  Saturating well after being placed on 2 L O2 nasal cannula.  After a short while, patient was weaned to room air with saturation 99%.      Discussed with granddaughter - Anastasia.  Family does not want patient  RDW 14.1 11.9 - 14.6 %    Platelets 262 150 - 450 K/uL    MPV 9.6 9.4 - 12.3 FL    nRBC 0.00 0.0 - 0.2 K/uL    Differential Type AUTOMATED      Neutrophils % 83 (H) 43 - 78 %    Lymphocytes % 13 13 - 44 %    Monocytes % 3 (L) 4.0 - 12.0 %    Eosinophils % 0 (L) 0.5 - 7.8 %    Basophils % 0 0.0 - 2.0 %    Immature Granulocytes 1 0.0 - 5.0 %    Neutrophils Absolute 4.8 1.7 - 8.2 K/UL    Lymphocytes Absolute 0.8 0.5 - 4.6 K/UL    Monocytes Absolute 0.2 0.1 - 1.3 K/UL    Eosinophils Absolute 0.0 0.0 - 0.8 K/UL    Basophils Absolute 0.0 0.0 - 0.2 K/UL    Absolute Immature Granulocyte 0.1 0.0 - 0.5 K/UL   Hepatitis Panel, Acute    Collection Time: 01/02/24  6:22 AM   Result Value Ref Range    Hep A IgM NONREACTIVE NR      Hep B Core Ab, IgM NONREACTIVE NR      Hepatitis B Surface Ag NONREACTIVE NR      Hepatitis C Ab NONREACTIVE NR     HIV 1/2 Ag/Ab, 4TH Generation,W Rflx Confirm    Collection Time: 01/02/24  6:22 AM   Result Value Ref Range    HIV 1/2 Interp NONREACTIVE NR      HIV 1/2 Result Comment SEE NOTE     Magnesium    Collection Time: 01/02/24  6:22 AM   Result Value Ref Range    Magnesium 1.9 1.8 - 2.4 mg/dL   Basic Metabolic Panel w/ Reflex to MG    Collection Time: 01/03/24  4:53 AM   Result Value Ref Range    Sodium 137 136 - 146 mmol/L    Potassium 3.9 3.5 - 5.1 mmol/L    Chloride 107 103 - 113 mmol/L    CO2 23 21 - 32 mmol/L    Anion Gap 7 2 - 11 mmol/L    Glucose 138 (H) 65 - 100 mg/dL    BUN 29 (H) 8 - 23 MG/DL    Creatinine 1.10 (H) 0.6 - 1.0 MG/DL    Est, Glom Filt Rate 52 (L) >60 ml/min/1.73m2    Calcium 9.1 8.3 - 10.4 MG/DL   EKG 12 Lead    Collection Time: 01/03/24 11:43 AM   Result Value Ref Range    Ventricular Rate 101 BPM    Atrial Rate 101 BPM    P-R Interval 150 ms    QRS Duration 70 ms    Q-T Interval 310 ms    QTc Calculation (Bazett) 401 ms    P Axis 55 degrees    R Axis 7 degrees    T Axis 43 degrees    Diagnosis       Sinus tachycardia  Otherwise normal ECG  No previous ECGs

## 2024-01-03 NOTE — PROGRESS NOTES
Patient/caregivers speak Taiwanese as their preferred language for their healthcare communication. For safe communication, use the [facility]  carts or call:    Imelda Espitia   Senior -Navigator [phone]  General phone: [phone] ( [phone])  Email: [email or web address]    Please always document the use of interpreting services ('s ID number) in your clinical notes.    Our interpreters are available for team members working with limited  English proficient (LEP) patients remotely, via phone or video or in person (if needed for special cases).

## 2024-01-03 NOTE — PROGRESS NOTES
Letter of Medical Necessity         I am writing on behalf of my patient, Imelda Coombs , to document the medical necessity of semi-electric bed and wheel chair.     Imelda Coombs 78 y.o. female with medical history significant for diffused large B cell lymphoma, chronic pain, CKD3 who was admitted with worsening lower back pain with bilateral lower extremity weakness and fall.  Workup revealed solid enhancing masses arising from multiple nerve roots in the T spine as well as solid enhancing intraventricular mass involving the left lateral ventricle. These findings are suspicious for CNS lymphoma.      Due to the medical conditions as listed, patient will requires positioning of the body in ways not feasible with and ordinary bed and need for immediate and frequent change in body position.  Patient has mobility limitation that significantly impair her from completing her MRADLs.  Without a wheelchair , she will not be able to safely participate in MRADLs.  Her limitations cannot be resolved by use of an appropriate fitted cane or walker as she does not have necessary strength in her lower extremities to support herself.  Having a wheel chair will significantly improve her ability to participate in MRADLs.    She is willing to use manual wheel chair on a regular basis in the home.   She lives in a home that provides adequate access between rooms, maneuvering space and surface for use of manual wheelchair.   She will also have family/caregiver who is available/willing/able to provide assistance with the wheelchair.          Ellie Mclean MD  01/03/24

## 2024-01-03 NOTE — PLAN OF CARE
Problem: Safety - Adult  Goal: Free from fall injury  Outcome: Progressing     Problem: Skin/Tissue Integrity  Goal: Absence of new skin breakdown  Description: 1.  Monitor for areas of redness and/or skin breakdown  2.  Assess vascular access sites hourly  3.  Every 4-6 hours minimum:  Change oxygen saturation probe site  4.  Every 4-6 hours:  If on nasal continuous positive airway pressure, respiratory therapy assess nares and determine need for appliance change or resting period.  Outcome: Progressing     Problem: Pain  Goal: Verbalizes/displays adequate comfort level or baseline comfort level  Outcome: Progressing

## 2024-01-03 NOTE — CARE COORDINATION
CM received notification from pt's granddaughter Anastasia that she discussed d/c planning with her family and they are not interested in pt being d/c to STR.  She stated \"we want her to focus on chemo and XRT.\"  She also stated they do want home physical therapy, a hospital bed, and a wheelchair.  CM will discuss with hospitalist.  CM will continue to follow.  LOS = 3 days    Update: CM spoke with Dr. Mclean and we will move forward with d/c plan for home.  CM has ordered a hospital bed and wheelchair from [facility].  Per the DME company they can deliver it on 1/4.  CM is awaiting family's choice for HH agency.  Pt will require EMS transport d/c pain and limited mobility.  Anticipated dispo: d/c home on 1/4 once DME is delivered.  CM will continue to follow.  LOS = 3 days    Update: Family meeting held at pt's bedside with live  and pt's granddaughter Anastasia on speaker phone.  Dr. Mclean, Rosangela Stein, DEXTER, Lily Beard, and Breonna Barger were present for the discussion.  Pt and family were provided with updates on pt's physical condition and what chemo and XRT may or may not do for pt.    Family will discuss and notify IDT tomorrow of their d/c plan.

## 2024-01-03 NOTE — PROGRESS NOTES
0700- Received patient from off going RN; patient in stable condition.     0813- Patient very anxious this AM. Family at bedside.     0815- Patient c/o \"dizziness\" oxygen saturation 89% on RA- placed 2L/min via NC.     0820- Patient's \"dizziness\" is better; oxygen saturation 92% on 2L/min via NC.    0831- Dr. Mclean aware via perfect serve.     0918- Oxygen saturation 95% on RA. Patient continues to c/o \"dizziness\".     1021- Lidocaine patch applied to patient's left knee.     1121- 24 hour PPD read and results are negative.     1123- Patient c/o left chest pain; patient continues to be very anxious. Family at bedside. PRN PO Xanax 0.5mg and PO Norco 5-325mg administered per MD order.     1124- MD aware. Orders received.     1230-  resources have been utilized; patient speaks over the  and leans her right ear towards her family. Patient remains anxiety.     1430- Live  Jeanie utilized with Anastasia on speaker phone, granddaughter at bedside, nursing supervisor, Rosangela, oncology NP, Lily, case management, Cat, and hospitalist Dr. Mclean at bedside.     1755- BSSR given to oncoming RN.    Breonna Barger RN.

## 2024-01-03 NOTE — PROGRESS NOTES
ACUTE OCCUPATIONAL THERAPY GOALS:   (Developed with and agreed upon by patient and/or caregiver.)  1. Pt will complete UB bathing and dressing with CGA using AE as needed  2. Pt will maintain sitting balance for ADLs with CGA  3. Pt will complete functional transfers for ADLs with max A  4. Pt will complete grooming with CGA  5. Pt will demonstrate independence with HEP to promote increased BUE strength and functional use for ADLs        Timeframe: 7 visits    OCCUPATIONAL THERAPY: Daily Note AM   OT Visit Days: 2   Time In/Out  OT Charge Capture  Rehab Caseload Tracker  OT Orders    Imelda Coombs is a 78 y.o. female   PRIMARY DIAGNOSIS: Lumbar spine tumor  Lumbar spine tumor [D49.2]       Inpatient: Payor: [facility] MEDICARE / Plan: [employer] DUAL COMPLETE / Product Type: *No Product type* /     ASSESSMENT:     REHAB RECOMMENDATIONS: CURRENT LEVEL OF FUNCTION:  (Most Recently Demonstrated)   Recommendation to date pending progress:  Setting:  Short-term Rehab  However at this time refusing and wishing to return home - would recommend HH therapies in this case    Equipment:     They have ordered hospital bed and w/c Bathing:  Not Tested  Dressing:  Not Tested  Feeding/Grooming:  Maximal Assist sitting balance  Toileting:  Not Tested  Functional Mobility:  Maximal Assist x2  bed mobility only     ASSESSMENT:  Ms. Senthil Coombs is doing poorly today, continues to complain of pain and very anxious throughout session. Pt presents supine and agreeable to therapy with education and encouragement. Pt initially requesting to utilize family members to interpret however this proved to be difficult so ipad  was obtained. Pt overall max Ax2 for supine<>sit. Pt very anxious which made it difficult to get her feet to touch the floor. Pt required max A for sitting balance with heavy posterior lean. Pt was able to perform simple grooming task but did continue to require assist for sitting balance. Once    Tub/Shower [] [] [] [] [] [] [] [] [] [x] []     Toilet [] [] [] [] [] [] [] [] [] [x] []      [] [] [] [] [] [] [] [] [] [] []    I=Independent, Mod I=Modified Independent, S=Supervision/Setup, SBA=Standby Assistance, CGA=Contact Guard Assistance, Min=Minimal Assistance, Mod=Moderate Assistance, Max=Maximal Assistance, Total=Total Assistance, NT=Not Tested    ACTIVITIES OF DAILY LIVING: I Mod I S SBA CGA Min Mod Max Total NT Comments   BASIC ADLs:              Upper Body   Bathing [] [] [] [] [] [] [] [] [] [x]     Lower Body Bathing [] [] [] [] [] [] [] [] [] [x]     Toileting [] [] [] [] [] [] [] [] [] [x]    Upper Body Dressing [] [] [] [] [] [] [] [] [] [x]    Lower Body Dressing [] [] [] [] [] [] [] [] [] [x]    Feeding [] [] [] [] [] [] [] [] [] [x]    Grooming [] [] [] [] [] [] [] [x] [] [] Assist with sitting balance, hair care   Personal Device Care [] [] [] [] [] [] [] [] [] [x]    Functional Mobility [] [] [] [] [] [] [] [x] [] [] X2, bed mobility only   I=Independent, Mod I=Modified Independent, S=Supervision/Setup, SBA=Standby Assistance, CGA=Contact Guard Assistance, Min=Minimal Assistance, Mod=Moderate Assistance, Max=Maximal Assistance, Total=Total Assistance, NT=Not Tested    BALANCE: Good Fair+ Fair Fair- Poor NT Comments   Sitting Static [] [] [] [x] [x] []    Sitting Dynamic [] [] [] [] [x] []              Standing Static [] [] [] [] [] [x]    Standing Dynamic [] [] [] [] [] [x]        PLAN:     FREQUENCY/DURATION   OT Plan of Care: 3 times/week for duration of hospital stay or until stated goals are met, whichever comes first.    TREATMENT:     TREATMENT:   Co-Treatment PT/OT necessary due to patient's decreased overall endurance/tolerance levels, as well as need for high level skilled assistance to complete functional transfers/mobility and functional tasks  Neuromuscular Re-education (24 Minutes): Patient participated in neuromuscular re-education including weight shifting, postural

## 2024-01-04 VITALS
HEART RATE: 90 BPM | TEMPERATURE: 98.1 F | WEIGHT: 149.25 LBS | DIASTOLIC BLOOD PRESSURE: 80 MMHG | HEIGHT: 61 IN | SYSTOLIC BLOOD PRESSURE: 123 MMHG | OXYGEN SATURATION: 97 % | BODY MASS INDEX: 28.18 KG/M2 | RESPIRATION RATE: 18 BRPM

## 2024-01-04 LAB
MM INDURATION, POC: 0 MM (ref 0–5)
PPD, POC: NEGATIVE

## 2024-01-04 PROCEDURE — 6370000000 HC RX 637 (ALT 250 FOR IP): Performed by: INTERNAL MEDICINE

## 2024-01-04 PROCEDURE — 6360000002 HC RX W HCPCS: Performed by: STUDENT IN AN ORGANIZED HEALTH CARE EDUCATION/TRAINING PROGRAM

## 2024-01-04 PROCEDURE — 6360000002 HC RX W HCPCS: Performed by: INTERNAL MEDICINE

## 2024-01-04 PROCEDURE — 2580000003 HC RX 258: Performed by: STUDENT IN AN ORGANIZED HEALTH CARE EDUCATION/TRAINING PROGRAM

## 2024-01-04 PROCEDURE — 6360000002 HC RX W HCPCS: Performed by: FAMILY MEDICINE

## 2024-01-04 PROCEDURE — 6370000000 HC RX 637 (ALT 250 FOR IP): Performed by: FAMILY MEDICINE

## 2024-01-04 PROCEDURE — 2580000003 HC RX 258: Performed by: FAMILY MEDICINE

## 2024-01-04 RX ORDER — HYDROCODONE BITARTRATE AND ACETAMINOPHEN 5; 325 MG/1; MG/1
1 TABLET ORAL EVERY 6 HOURS PRN
Qty: 20 TABLET | Refills: 0 | Status: SHIPPED | OUTPATIENT
Start: 2024-01-04 | End: 2024-01-09

## 2024-01-04 RX ORDER — NALOXONE HYDROCHLORIDE 2 MG/.4ML
2 INJECTION, SOLUTION INTRAMUSCULAR; SUBCUTANEOUS
Qty: 0.4 ML | Refills: 0 | Status: SHIPPED | OUTPATIENT
Start: 2024-01-04 | End: 2024-01-04

## 2024-01-04 RX ORDER — DEXAMETHASONE 4 MG/1
4 TABLET ORAL EVERY 6 HOURS
Qty: 120 TABLET | Refills: 0 | Status: SHIPPED | OUTPATIENT
Start: 2024-01-04 | End: 2024-02-03

## 2024-01-04 RX ADMIN — AMITRIPTYLINE HYDROCHLORIDE 25 MG: 25 TABLET, FILM COATED ORAL at 08:28

## 2024-01-04 RX ADMIN — AMLODIPINE BESYLATE 5 MG: 5 TABLET ORAL at 08:28

## 2024-01-04 RX ADMIN — CEFTRIAXONE SODIUM 2000 MG: 2 INJECTION, POWDER, FOR SOLUTION INTRAMUSCULAR; INTRAVENOUS at 05:00

## 2024-01-04 RX ADMIN — HYDROCODONE BITARTRATE AND ACETAMINOPHEN 1 TABLET: 5; 325 TABLET ORAL at 08:29

## 2024-01-04 RX ADMIN — DEXAMETHASONE SODIUM PHOSPHATE 4 MG: 4 INJECTION INTRA-ARTICULAR; INTRALESIONAL; INTRAMUSCULAR; INTRAVENOUS; SOFT TISSUE at 08:30

## 2024-01-04 RX ADMIN — LEVOTHYROXINE SODIUM 75 MCG: 0.07 TABLET ORAL at 08:28

## 2024-01-04 RX ADMIN — POLYETHYLENE GLYCOL 3350 17 G: 17 POWDER, FOR SOLUTION ORAL at 08:28

## 2024-01-04 RX ADMIN — SODIUM CHLORIDE, PRESERVATIVE FREE 10 ML: 5 INJECTION INTRAVENOUS at 08:29

## 2024-01-04 RX ADMIN — ATORVASTATIN CALCIUM 40 MG: 40 TABLET, FILM COATED ORAL at 08:28

## 2024-01-04 RX ADMIN — ENOXAPARIN SODIUM 40 MG: 100 INJECTION SUBCUTANEOUS at 08:30

## 2024-01-04 RX ADMIN — SERTRALINE 100 MG: 100 TABLET, FILM COATED ORAL at 08:28

## 2024-01-04 RX ADMIN — PANTOPRAZOLE SODIUM 40 MG: 40 TABLET, DELAYED RELEASE ORAL at 08:28

## 2024-01-04 RX ADMIN — SUCRALFATE 1 G: 1 TABLET ORAL at 08:28

## 2024-01-04 RX ADMIN — DEXAMETHASONE SODIUM PHOSPHATE 4 MG: 4 INJECTION INTRA-ARTICULAR; INTRALESIONAL; INTRAMUSCULAR; INTRAVENOUS; SOFT TISSUE at 01:56

## 2024-01-04 RX ADMIN — DOCUSATE SODIUM 100 MG: 100 CAPSULE, LIQUID FILLED ORAL at 08:28

## 2024-01-04 RX ADMIN — SUCRALFATE 1 G: 1 TABLET ORAL at 11:26

## 2024-01-04 RX ADMIN — ALPRAZOLAM 0.5 MG: 0.5 TABLET ORAL at 08:28

## 2024-01-04 ASSESSMENT — PAIN DESCRIPTION - PAIN TYPE: TYPE: ACUTE PAIN

## 2024-01-04 ASSESSMENT — PAIN DESCRIPTION - ONSET: ONSET: ON-GOING

## 2024-01-04 ASSESSMENT — PAIN SCALES - GENERAL
PAINLEVEL_OUTOF10: 8
PAINLEVEL_OUTOF10: 0

## 2024-01-04 ASSESSMENT — PAIN DESCRIPTION - LOCATION: LOCATION: CHEST

## 2024-01-04 ASSESSMENT — PAIN DESCRIPTION - FREQUENCY: FREQUENCY: CONTINUOUS

## 2024-01-04 ASSESSMENT — PAIN DESCRIPTION - DESCRIPTORS: DESCRIPTORS: SORE

## 2024-01-04 ASSESSMENT — PAIN - FUNCTIONAL ASSESSMENT: PAIN_FUNCTIONAL_ASSESSMENT: PREVENTS OR INTERFERES SOME ACTIVE ACTIVITIES AND ADLS

## 2024-01-04 ASSESSMENT — PAIN DESCRIPTION - ORIENTATION: ORIENTATION: LEFT;MID;OUTER

## 2024-01-04 NOTE — CARE COORDINATION
Pt discussed during IDR.  Pt rounded at bedside by DEXTER and Dr. Mclean.  Pt's granddaughter Anastasia on speaker phone to discuss d/c planning.  Plan to d/c home today.  DME (hospital bed and wheelchair) are scheduled to be delivered today.  Anastasia is to notify CM when DME is on the way so that CM can arrange EMS transport home.  CM provided Anastasia with a list of  agencies that accept pt's insurance - awaiting choice.  Family requested a physician-signed DMV handicap placcard form.  CM had Dr. Mclean sign and provided the form to the relative in pt's room.  Currently the family plans to pursue OP Tx at the [facility].  CM will continue to follow.  LOS = 4 days

## 2024-01-04 NOTE — FLOWSHEET NOTE
Discharge paperwork and education given to patient, patient's granddaughters X2, and son in law at bedside.

## 2024-01-04 NOTE — PLAN OF CARE
Problem: Safety - Adult  Goal: Free from fall injury  1/4/2024 0831 by Breonna Barger RN  Outcome: Progressing  Flowsheets (Taken 1/4/2024 0310 by Kenyatta Mancera RN)  Free From Fall Injury: Instruct family/caregiver on patient safety  1/3/2024 1937 by Kenyatta Mancera RN  Outcome: Progressing  Flowsheets (Taken 1/3/2024 1937)  Free From Fall Injury: Instruct family/caregiver on patient safety     Problem: Skin/Tissue Integrity  Goal: Absence of new skin breakdown  Description: 1.  Monitor for areas of redness and/or skin breakdown  2.  Assess vascular access sites hourly  3.  Every 4-6 hours minimum:  Change oxygen saturation probe site  4.  Every 4-6 hours:  If on nasal continuous positive airway pressure, respiratory therapy assess nares and determine need for appliance change or resting period.  1/4/2024 0831 by Breonna Barger RN  Outcome: Progressing  1/3/2024 1937 by Kenyatta Mancera RN  Outcome: Progressing     Problem: Pain  Goal: Verbalizes/displays adequate comfort level or baseline comfort level  1/4/2024 0831 by Breonna Barger RN  Outcome: Progressing  1/3/2024 1937 by Kenyatta Mancera RN  Outcome: Progressing  Flowsheets (Taken 1/3/2024 1937)  Verbalizes/displays adequate comfort level or baseline comfort level: Encourage patient to monitor pain and request assistance     Problem: Neurosensory - Adult  Goal: Achieves stable or improved neurological status  1/4/2024 0831 by Breonna Barger RN  Outcome: Progressing  1/3/2024 1937 by Kenyatta Mancera RN  Outcome: Progressing  Flowsheets (Taken 1/3/2024 1937)  Achieves stable or improved neurological status: Assess for and report changes in neurological status  Goal: Achieves maximal functionality and self care  1/4/2024 0831 by Breonna Barger RN  Outcome: Progressing  1/3/2024 1937 by Kenyatta Mancera RN  Outcome: Progressing  Flowsheets (Taken 1/3/2024 1937)  Achieves maximal functionality and self care: Monitor swallowing and airway patency with

## 2024-01-04 NOTE — CARE COORDINATION
Pt to d/c home today.  Transport scheduled via [employer] for 1530.  Per family's choice HH ordered through [facility]: SN, PT, OT, CNA.  [facility] and were delivered to pt's residence today.  DEXTER has spoken with Edda at the [facility] who will assist family with arranging transport to PET scan appointment next week.  CM provided family with a physician signed DMV handicap placard form per their request.  No other supportive care needs identified.  Pt and family agree with d/c plan.  Milestones met.  LOS = 4 days    Update: 1550: CM received notification from [facility] that they are not currently accepting pts with [facility].  CM contacted pt's granddaughter Anastasia who requested the referral be sent to [facility].  CM has submitted this referral.     01/01/24 3673   Service Assessment   Patient Orientation Alert and Oriented;Person;Place;Self   Cognition Alert   History Provided By Medical Record;Child/Family;Patient   Primary Caregiver Family   Accompanied By/Relationship Daughter   Support Systems Children;Family Members   Patient's Healthcare Decision Maker is: Legal Next of Kin  (Daughter Vida Chao [phone])   PCP Verified by CM Yes  (Asad Galeas, )   Last Visit to PCP Within last 6 months   Prior Functional Level Assistance with the following:;Mobility;Shopping;Housework   Current Functional Level Assistance with the following:;Mobility;Shopping;Housework   Can patient return to prior living arrangement Yes   Ability to make needs known: Good   Family able to assist with home care needs: Yes   Would you like for me to discuss the discharge plan with any other family members/significant others, and if so, who? Yes  (Daughter and granddaughter)   Financial Resources Medicare;Medicaid  ([facility] Medicare)   Community Resources None   CM/SW Referral Other (see comment)  (N/A)   Social/Functional History   Lives With Daughter   Type of Home House   Home Layout

## 2024-01-04 NOTE — DISCHARGE SUMMARY
Hospitalist Discharge Summary   Admit Date:  2023  6:14 PM   DC Note date: 2024  Name:  Imelda Coombs   Age:  78 y.o.  Sex:  female  :  1945   MRN:  [id number]   Room:  [facility] [place]  PCP:  Asad Galeas DO    Presenting Complaint: No chief complaint on file.     Initial Admission Diagnosis: Lumbar spine tumor [D49.2]     Problem List for this Hospitalization (present on admission):    Principal Problem:    Lumbar spine tumor  Active Problems:    Diffuse large B-cell lymphoma, unspecified body region (HCC)    Major depressive disorder with single episode, in full remission (HCC)    CKD (chronic kidney disease) stage 3, GFR 30-59 ml/min (HCC)    Depression    Hypertension    Anxiety    Weakness of both lower extremities    Acute UTI    CNS lymphoma (HCC)  Resolved Problems:    * No resolved hospital problems. *      Hospital Course:  Please refer to the admission H&P for details of presentation. In summary, Imelda Coombs is a 78 y.o. female with past medical history significant for diffused large B cell lymphoma (treated 2023 @ [facility]) , chronic pain, CKD who was admitted for lower back pain w/ LE weakness and falls.   CT L spine and MRI L spine showed solid enhancing masses arising from multiple nerve roots as well as multilevel degenerative disease.  Workup also revealed urinary tract infection and Rocephin was started.  MRI Brain with solid enhancing intraventricular mass involving the left lateral ventricle.     Neurosurgery was consulted and not recommending any no emergent neurosurgical interventions are needed given that the patient's symptoms have been present for longer than 48 hours most likely patient will never regain the use of her legs.   They felt that patient may be eligible for a biopsy to confirm diagnosis of lymphoma as well as possibility that the thoracic lesion can be debulked and had laminectomy in order to allow for swelling that would occur with  Diagnoses: Major depressive disorder with single episode, in full remission (HCC); Anxiety      omeprazole (PRILOSEC) 20 MG delayed release capsule TAKE ONE CAPSULE BY MOUTH ONE TIME DAILY  Qty: 90 capsule, Refills: 3    Associated Diagnoses: GERD without esophagitis      amLODIPine (NORVASC) 5 MG tablet Take 1 tablet by mouth daily TAKE ONE TABLET BY MOUTH ONE TIME DAILY  Qty: 90 tablet, Refills: 3    Associated Diagnoses: Essential hypertension      fluticasone (FLONASE) 50 MCG/ACT nasal spray 2 sprays by Nasal route daily  Qty: 16 g, Refills: 3    Associated Diagnoses: Environmental allergies           STOP taking these medications       pregabalin (LYRICA) 75 MG capsule Comments:   Reason for Stopping:         ondansetron (ZOFRAN-ODT) 4 MG disintegrating tablet Comments:   Reason for Stopping:         sucralfate (CARAFATE) 1 GM tablet Comments:   Reason for Stopping:         traMADol (ULTRAM) 50 MG tablet Comments:   Reason for Stopping:         amitriptyline (ELAVIL) 25 MG tablet Comments:   Reason for Stopping:               Some of the medications may be marked as \"stop taking\" by the system; but in reality pt or family reported already being off these meds; defer to outpatient/prescribing providers.    Procedures done this admission:  * No surgery found *    Consults this admission:  IP CONSULT TO ONCOLOGY  IP CONSULT TO INTERVENTIONAL RADIOLOGY  IP CONSULT TO NEUROLOGY  IP CONSULT TO NEUROSURGERY  IP CONSULT HOME HEALTH    Echocardiogram results:  No results found for this or any previous visit.      Diagnostic Imaging/Tests:   XR CHEST PORTABLE    Result Date: 1/3/2024  No infiltrates or consolidation.     MRI THORACIC SPINE W WO CONTRAST    Result Date: 1/2/2024  There is abnormal signal in the central cord which extends from the T6-12 level which does not enhance.  There is an intra-axial and extra-axial mass of the cord in the left spinal canal which shows homogeneous postcontrast enhancement, centered

## 2024-01-05 ENCOUNTER — CARE COORDINATION (OUTPATIENT)
Dept: CARE COORDINATION | Facility: CLINIC | Age: 79
End: 2024-01-05

## 2024-01-05 ENCOUNTER — HOME HEALTH ADMISSION (OUTPATIENT)
Dept: HOME HEALTH SERVICES | Facility: HOME HEALTH | Age: 79
End: 2024-01-05
Payer: MEDICARE

## 2024-01-05 DIAGNOSIS — E07.9 THYROID DISEASE: ICD-10-CM

## 2024-01-05 LAB
BACTERIA SPEC CULT: NORMAL
FLOW CYTOMETRY RESULTS: NORMAL
GRAM STN SPEC: NORMAL
GRAM STN SPEC: NORMAL
SERVICE CMNT-IMP: NORMAL
SPECIMEN SOURCE: NORMAL
TEST ORDERED: NORMAL

## 2024-01-05 NOTE — CARE COORDINATION
Care Transitions Initial Follow Up Call    Call within 2 business days of discharge: Yes    Patient Current Location:  Home: [address] [place] [address] [place] [address]    Care Transition Nurse contacted the caregiver by telephone to perform post hospital discharge assessment. Verified name and  with caregiver as identifiers. Provided introduction to self, and explanation of the Care Transition Nurse role.     Patient: Imelda Coombs Patient : 1945   MRN: [id number]  Reason for Admission: Mass of spinal cord   Discharge Date: 24 RARS: Readmission Risk Score: 17.1    Last Discharge Facility       Date Complaint Diagnosis Description Type Department Provider    23  Mass of spinal cord (HCC) Admission (Discharged) [id number] Ellie Mclean MD            Was this an external facility discharge? No Discharge Facility: [facility]    Challenges to be reviewed by the provider   Additional needs identified to be addressed with provider: No  none               Method of communication with provider: none.      Care Transition Nurse reviewed discharge instructions, medical action plan, and red flags with caregiver who verbalized understanding. The family and caregiver was given an opportunity to ask questions and does not have any further questions or concerns at this time. Were discharge instructions available to patient? Yes. Reviewed appropriate site of care based on symptoms and resources available to patient including: PCP  Specialist  Home health  When to call 911. The family and caregiver agrees to contact the PCP office for questions related to their healthcare.     Advance Care Planning:   Does patient have an Advance Directive: not on file.    Medication reconciliation was performed with family and caregiver, who verbalizes understanding of administration of home medications. Medications reviewed, 1111F entered: no    Was patient discharged with a pulse oximeter? no    Non-face-to-face services

## 2024-01-06 ENCOUNTER — HOME CARE VISIT (OUTPATIENT)
Dept: SCHEDULING | Facility: HOME HEALTH | Age: 79
End: 2024-01-06
Payer: MEDICARE

## 2024-01-06 PROCEDURE — G0299 HHS/HOSPICE OF RN EA 15 MIN: HCPCS

## 2024-01-07 VITALS
DIASTOLIC BLOOD PRESSURE: 70 MMHG | HEART RATE: 60 BPM | HEIGHT: 61 IN | SYSTOLIC BLOOD PRESSURE: 170 MMHG | TEMPERATURE: 96.6 F | BODY MASS INDEX: 28.13 KG/M2 | OXYGEN SATURATION: 97 % | RESPIRATION RATE: 12 BRPM | WEIGHT: 149 LBS

## 2024-01-07 ASSESSMENT — ENCOUNTER SYMPTOMS
CONTUSION: 1
CONSTIPATION: 1
HEMOPTYSIS: 0
STOOL DESCRIPTION: SOFT FORMED
PAIN LOCATION - PAIN QUALITY: CRAMPS

## 2024-01-08 ENCOUNTER — HOME CARE VISIT (OUTPATIENT)
Dept: HOME HEALTH SERVICES | Facility: HOME HEALTH | Age: 79
End: 2024-01-08
Payer: MEDICARE

## 2024-01-08 ENCOUNTER — CLINICAL DOCUMENTATION (OUTPATIENT)
Dept: ONCOLOGY | Age: 79
End: 2024-01-08

## 2024-01-08 ENCOUNTER — HOME CARE VISIT (OUTPATIENT)
Dept: SCHEDULING | Facility: HOME HEALTH | Age: 79
End: 2024-01-08
Payer: MEDICARE

## 2024-01-08 VITALS
SYSTOLIC BLOOD PRESSURE: 138 MMHG | OXYGEN SATURATION: 100 % | TEMPERATURE: 97.3 F | HEART RATE: 73 BPM | RESPIRATION RATE: 17 BRPM | DIASTOLIC BLOOD PRESSURE: 68 MMHG

## 2024-01-08 VITALS
TEMPERATURE: 98.4 F | SYSTOLIC BLOOD PRESSURE: 138 MMHG | DIASTOLIC BLOOD PRESSURE: 60 MMHG | RESPIRATION RATE: 16 BRPM | HEART RATE: 73 BPM | OXYGEN SATURATION: 100 %

## 2024-01-08 PROCEDURE — G0299 HHS/HOSPICE OF RN EA 15 MIN: HCPCS

## 2024-01-08 PROCEDURE — G0155 HHCP-SVS OF CSW,EA 15 MIN: HCPCS

## 2024-01-08 PROCEDURE — G0152 HHCP-SERV OF OT,EA 15 MIN: HCPCS

## 2024-01-08 RX ORDER — LEVOTHYROXINE SODIUM 0.05 MG/1
50 TABLET ORAL DAILY
Qty: 90 TABLET | Refills: 3 | Status: SHIPPED | OUTPATIENT
Start: 2024-01-08

## 2024-01-08 ASSESSMENT — ENCOUNTER SYMPTOMS
PAIN LOCATION - PAIN QUALITY: SHINGLES
NAUSEA: 1
STOOL DESCRIPTION: SOFT FORMED
DYSPNEA ACTIVITY LEVEL: AFTER AMBULATING MORE THAN 20 FT
ABDOMINAL PAIN: 1
CONSTIPATION: 1
CONTUSION: 1

## 2024-01-09 ENCOUNTER — HOME CARE VISIT (OUTPATIENT)
Dept: SCHEDULING | Facility: HOME HEALTH | Age: 79
End: 2024-01-09
Payer: MEDICARE

## 2024-01-09 ENCOUNTER — TELEPHONE (OUTPATIENT)
Dept: FAMILY MEDICINE CLINIC | Facility: CLINIC | Age: 79
End: 2024-01-09

## 2024-01-09 VITALS
HEART RATE: 84 BPM | SYSTOLIC BLOOD PRESSURE: 138 MMHG | DIASTOLIC BLOOD PRESSURE: 73 MMHG | RESPIRATION RATE: 16 BRPM | TEMPERATURE: 97.7 F

## 2024-01-09 PROCEDURE — G0156 HHCP-SVS OF AIDE,EA 15 MIN: HCPCS

## 2024-01-09 NOTE — TELEPHONE ENCOUNTER
Per Dr. Galeas check with daughter and see if patient needs to be seen or if they just want to speak on the phone. I was told she speaks Nepali. Do you mind calling?

## 2024-01-09 NOTE — TELEPHONE ENCOUNTER
Per Dr. Galeas called  daughter no answer left a message to call our office back in regard an office appointment for mrs.Osorio sotelo. Called granddaughter (Anastasia Ortiz to ask  if patient needs to be seen in the office or if they just want to speak on the phone.  Per Anastasai the best person to answer all those question please call  Vida Herve tomorrow 01/10 in the morning.

## 2024-01-10 ENCOUNTER — HOME CARE VISIT (OUTPATIENT)
Dept: SCHEDULING | Facility: HOME HEALTH | Age: 79
End: 2024-01-10
Payer: MEDICARE

## 2024-01-10 VITALS
DIASTOLIC BLOOD PRESSURE: 70 MMHG | RESPIRATION RATE: 16 BRPM | TEMPERATURE: 98.1 F | OXYGEN SATURATION: 98 % | SYSTOLIC BLOOD PRESSURE: 120 MMHG | HEART RATE: 78 BPM

## 2024-01-10 PROCEDURE — G0299 HHS/HOSPICE OF RN EA 15 MIN: HCPCS

## 2024-01-11 ENCOUNTER — HOSPITAL ENCOUNTER (OUTPATIENT)
Dept: PET IMAGING | Age: 79
Discharge: HOME | End: 2024-01-11
Payer: MEDICARE

## 2024-01-11 ENCOUNTER — HOME CARE VISIT (OUTPATIENT)
Dept: SCHEDULING | Facility: HOME HEALTH | Age: 79
End: 2024-01-11

## 2024-01-11 DIAGNOSIS — D49.2 LUMBAR SPINE TUMOR: ICD-10-CM

## 2024-01-11 DIAGNOSIS — G95.89 MASS OF SPINAL CORD (HCC): ICD-10-CM

## 2024-01-11 DIAGNOSIS — C41.2 MALIGNANT NEOPLASM OF VERTEBRAL COLUMN (HCC): ICD-10-CM

## 2024-01-11 DIAGNOSIS — C83.30 DIFFUSE LARGE B-CELL LYMPHOMA, UNSPECIFIED BODY REGION (HCC): ICD-10-CM

## 2024-01-11 LAB
GLUCOSE BLD STRIP.AUTO-MCNC: 85 MG/DL (ref 65–100)
SERVICE CMNT-IMP: NORMAL

## 2024-01-11 PROCEDURE — 6360000004 HC RX CONTRAST MEDICATION

## 2024-01-11 PROCEDURE — 78815 PET IMAGE W/CT SKULL-THIGH: CPT

## 2024-01-11 PROCEDURE — 82962 GLUCOSE BLOOD TEST: CPT

## 2024-01-11 PROCEDURE — 2580000003 HC RX 258

## 2024-01-11 PROCEDURE — 3430000000 HC RX DIAGNOSTIC RADIOPHARMACEUTICAL

## 2024-01-11 PROCEDURE — A9609 HC RX DIAGNOSTIC RADIOPHARMACEUTICAL: HCPCS

## 2024-01-11 RX ORDER — SODIUM CHLORIDE 0.9 % (FLUSH) 0.9 %
10 SYRINGE (ML) INJECTION ONCE AS NEEDED
Status: COMPLETED | OUTPATIENT
Start: 2024-01-11 | End: 2024-01-11

## 2024-01-11 RX ORDER — FLUDEOXYGLUCOSE F 18 200 MCI/ML
13 INJECTION, SOLUTION INTRAVENOUS
Status: COMPLETED | OUTPATIENT
Start: 2024-01-11 | End: 2024-01-11

## 2024-01-11 RX ADMIN — DIATRIZOATE MEGLUMINE AND DIATRIZOATE SODIUM 10 ML: 660; 100 LIQUID ORAL; RECTAL at 10:46

## 2024-01-11 RX ADMIN — SODIUM CHLORIDE, PRESERVATIVE FREE 10 ML: 5 INJECTION INTRAVENOUS at 10:46

## 2024-01-11 RX ADMIN — FLUDEOXYGLUCOSE F 18 13 MILLICURIE: 200 INJECTION, SOLUTION INTRAVENOUS at 10:46

## 2024-01-12 ENCOUNTER — HOME CARE VISIT (OUTPATIENT)
Dept: SCHEDULING | Facility: HOME HEALTH | Age: 79
End: 2024-01-12
Payer: MEDICARE

## 2024-01-12 ENCOUNTER — HOME CARE VISIT (OUTPATIENT)
Dept: HOME HEALTH SERVICES | Facility: HOME HEALTH | Age: 79
End: 2024-01-12
Payer: MEDICARE

## 2024-01-12 ENCOUNTER — TELEMEDICINE (OUTPATIENT)
Dept: FAMILY MEDICINE CLINIC | Facility: CLINIC | Age: 79
End: 2024-01-12

## 2024-01-12 VITALS
SYSTOLIC BLOOD PRESSURE: 121 MMHG | HEART RATE: 69 BPM | DIASTOLIC BLOOD PRESSURE: 73 MMHG | RESPIRATION RATE: 17 BRPM | TEMPERATURE: 98.4 F

## 2024-01-12 DIAGNOSIS — F41.9 ANXIETY: ICD-10-CM

## 2024-01-12 DIAGNOSIS — K59.09 OTHER CONSTIPATION: Primary | ICD-10-CM

## 2024-01-12 DIAGNOSIS — M89.8X8 MASS OF SPINE: ICD-10-CM

## 2024-01-12 DIAGNOSIS — I10 ESSENTIAL HYPERTENSION: ICD-10-CM

## 2024-01-12 DIAGNOSIS — C85.10 B-CELL LYMPHOMA, UNSPECIFIED B-CELL LYMPHOMA TYPE, UNSPECIFIED BODY REGION (HCC): ICD-10-CM

## 2024-01-12 PROCEDURE — G0156 HHCP-SVS OF AIDE,EA 15 MIN: HCPCS

## 2024-01-12 RX ORDER — POLYETHYLENE GLYCOL 3350 17 G/17G
17 POWDER ORAL DAILY
Qty: 17.9 G | Refills: 5 | Status: SHIPPED | OUTPATIENT
Start: 2024-01-12

## 2024-01-12 RX ORDER — AMLODIPINE BESYLATE 5 MG/1
5 TABLET ORAL DAILY
Qty: 90 TABLET | Refills: 3 | Status: SHIPPED | OUTPATIENT
Start: 2024-01-12

## 2024-01-12 NOTE — PROGRESS NOTES
PROGRESS NOTE    SUBJECTIVE:   Imelda Coombs is a 78 y.o. female seen for a follow up visit regarding the following chief complaint:     Chief Complaint   Patient presents with    Follow-up           HPI patient recently admitted to the hospital prognosis is not good B-cell lymphoma with a spinal mass which has metastasized Home health called me over the weekend some aware of everything and rather than have her come in for follow-up with all his COVID and flu we did a phone call visit to go over her plan and results PET scan is pendingImelda Coombs is a 78 y.o. female evaluated via telephone on 1/12/2024 for Follow-up  .        Total Time: minutes: 21-30 minutes    Imelda Coombs was evaluated through a synchronous (real-time) audio encounter. Patient identification was verified at the start of the visit. She (or guardian if applicable) is aware that this is a billable service, which includes applicable co-pays. This visit was conducted with the patient's (and/or legal guardian's) verbal consent. She has not had a related appointment within my department in the past 7 days or scheduled within the next 24 hours.   The patient was located at Home: [address] [place] [address] [place] [address].  The provider was located at Facility (Appt Dept): [address] [place] [address] [address]  [place] [address].    Note: not billable if this call serves to triage the patient into an appointment for the relevant concern         Past Medical History, Past Surgical History, Family history, Social History, and Medications were all reviewed with the patient today and updated as necessary.       Current Outpatient Medications   Medication Sig Dispense Refill    polyethylene glycol (MIRALAX) 17 GM/SCOOP POWD powder Take 17 g by mouth daily 17.9 g 5    amLODIPine (NORVASC) 5 MG tablet Take 1 tablet by mouth daily TAKE ONE TABLET BY MOUTH ONE TIME DAILY 90 tablet 3    levothyroxine (SYNTHROID) 50 MCG tablet

## 2024-01-15 ENCOUNTER — TELEPHONE (OUTPATIENT)
Dept: FAMILY MEDICINE CLINIC | Facility: CLINIC | Age: 79
End: 2024-01-15

## 2024-01-15 ENCOUNTER — HOME CARE VISIT (OUTPATIENT)
Dept: SCHEDULING | Facility: HOME HEALTH | Age: 79
End: 2024-01-15
Payer: MEDICARE

## 2024-01-15 VITALS
SYSTOLIC BLOOD PRESSURE: 124 MMHG | RESPIRATION RATE: 18 BRPM | OXYGEN SATURATION: 98 % | TEMPERATURE: 98.2 F | DIASTOLIC BLOOD PRESSURE: 82 MMHG | HEART RATE: 72 BPM

## 2024-01-15 VITALS
RESPIRATION RATE: 16 BRPM | TEMPERATURE: 97.8 F | SYSTOLIC BLOOD PRESSURE: 110 MMHG | HEART RATE: 68 BPM | DIASTOLIC BLOOD PRESSURE: 72 MMHG | OXYGEN SATURATION: 98 %

## 2024-01-15 DIAGNOSIS — G89.3 CHRONIC PAIN DUE TO NEOPLASM: Primary | ICD-10-CM

## 2024-01-15 DIAGNOSIS — G95.89 MASS OF SPINAL CORD (HCC): Primary | ICD-10-CM

## 2024-01-15 PROCEDURE — G0299 HHS/HOSPICE OF RN EA 15 MIN: HCPCS

## 2024-01-15 PROCEDURE — G0151 HHCP-SERV OF PT,EA 15 MIN: HCPCS

## 2024-01-15 RX ORDER — HYDROCODONE BITARTRATE AND ACETAMINOPHEN 5; 325 MG/1; MG/1
1 TABLET ORAL EVERY 8 HOURS PRN
Qty: 60 TABLET | Refills: 0 | Status: SHIPPED | OUTPATIENT
Start: 2024-01-15 | End: 2024-02-14

## 2024-01-15 ASSESSMENT — ENCOUNTER SYMPTOMS
PAIN LOCATION - PAIN QUALITY: ACHE
CONSTIPATION: 1

## 2024-01-15 NOTE — TELEPHONE ENCOUNTER
Patient with a history of lymphoma with now metastasis to brain and spinal cord and pain was not given enough pain medication till her next visit with oncology discussed with home health nurse will send medication into pharmacy supportive care given discussed hospice with daughter and nurse

## 2024-01-16 ENCOUNTER — HOSPICE ADMISSION (OUTPATIENT)
Dept: HOSPICE | Facility: HOSPICE | Age: 79
End: 2024-01-16
Payer: MEDICARE

## 2024-01-16 ENCOUNTER — TELEPHONE (OUTPATIENT)
Dept: FAMILY MEDICINE CLINIC | Facility: CLINIC | Age: 79
End: 2024-01-16

## 2024-01-16 ENCOUNTER — CARE COORDINATION (OUTPATIENT)
Dept: CARE COORDINATION | Facility: CLINIC | Age: 79
End: 2024-01-16

## 2024-01-16 ENCOUNTER — HOME CARE VISIT (OUTPATIENT)
Dept: SCHEDULING | Facility: HOME HEALTH | Age: 79
End: 2024-01-16
Payer: MEDICARE

## 2024-01-16 VITALS
DIASTOLIC BLOOD PRESSURE: 78 MMHG | SYSTOLIC BLOOD PRESSURE: 112 MMHG | OXYGEN SATURATION: 98 % | TEMPERATURE: 97.6 F | RESPIRATION RATE: 17 BRPM | HEART RATE: 68 BPM

## 2024-01-16 DIAGNOSIS — C85.10 B-CELL LYMPHOMA, UNSPECIFIED B-CELL LYMPHOMA TYPE, UNSPECIFIED BODY REGION (HCC): Primary | ICD-10-CM

## 2024-01-16 DIAGNOSIS — C83.38 DIFFUSE LARGE B-CELL LYMPHOMA OF LYMPH NODES OF MULTIPLE REGIONS (HCC): ICD-10-CM

## 2024-01-16 PROCEDURE — G0152 HHCP-SERV OF OT,EA 15 MIN: HCPCS

## 2024-01-16 NOTE — TELEPHONE ENCOUNTER
Discussed patient's medical history and most recent medical history with daughter and patient and discussed options including continuing with oncology care versus hospice with the most recent PET scan prognosis is poor family had discussion with nurse at the house and discussed hospice care

## 2024-01-16 NOTE — CARE COORDINATION
Education of patient/family/caregiver/guardian to support self-management-symptoms management, medications adherence and management, self monitoring and when to seek care, and follow up appointments as scheduled. Patient is still current with [facility].  Patient has good support from her family.  Care Transitions Subsequent and Final Call    Subsequent and Final Calls  Care Transitions Interventions  Other Interventions:           Care Transition Nurse provided contact information for future needs. No further follow-up call indicated based on severity of symptoms and risk factors.  Eusebia Wood RN

## 2024-01-17 ENCOUNTER — HOME CARE VISIT (OUTPATIENT)
Dept: SCHEDULING | Facility: HOME HEALTH | Age: 79
End: 2024-01-17
Payer: MEDICARE

## 2024-01-17 ENCOUNTER — CLINICAL DOCUMENTATION (OUTPATIENT)
Dept: ONCOLOGY | Age: 79
End: 2024-01-17

## 2024-01-17 VITALS
OXYGEN SATURATION: 99 % | RESPIRATION RATE: 18 BRPM | HEART RATE: 78 BPM | DIASTOLIC BLOOD PRESSURE: 74 MMHG | TEMPERATURE: 98.1 F | SYSTOLIC BLOOD PRESSURE: 122 MMHG

## 2024-01-17 PROCEDURE — G0299 HHS/HOSPICE OF RN EA 15 MIN: HCPCS

## 2024-01-17 NOTE — PROGRESS NOTES
[employer] faxed Certification of Medical Necessity for Non Emergency Stretcher Transportation form for pt's [facility] appt date 1/24/24

## 2024-01-19 ENCOUNTER — HOME CARE VISIT (OUTPATIENT)
Dept: HOSPICE | Facility: HOSPICE | Age: 79
End: 2024-01-19
Payer: MEDICARE

## 2024-01-19 ENCOUNTER — HOME CARE VISIT (OUTPATIENT)
Dept: SCHEDULING | Facility: HOME HEALTH | Age: 79
End: 2024-01-19
Payer: MEDICARE

## 2024-01-19 VITALS
BODY MASS INDEX: 28.13 KG/M2 | HEART RATE: 72 BPM | RESPIRATION RATE: 12 BRPM | SYSTOLIC BLOOD PRESSURE: 122 MMHG | WEIGHT: 149 LBS | DIASTOLIC BLOOD PRESSURE: 60 MMHG | TEMPERATURE: 97.7 F | HEIGHT: 61 IN

## 2024-01-19 VITALS
RESPIRATION RATE: 17 BRPM | DIASTOLIC BLOOD PRESSURE: 76 MMHG | SYSTOLIC BLOOD PRESSURE: 116 MMHG | HEART RATE: 69 BPM | TEMPERATURE: 97.9 F

## 2024-01-19 PROCEDURE — 0651 HSPC ROUTINE HOME CARE

## 2024-01-19 PROCEDURE — G0156 HHCP-SVS OF AIDE,EA 15 MIN: HCPCS

## 2024-01-19 PROCEDURE — G0299 HHS/HOSPICE OF RN EA 15 MIN: HCPCS

## 2024-01-19 ASSESSMENT — ENCOUNTER SYMPTOMS
NAUSEA: 1
ABDOMINAL PAIN: 1
PAIN LOCATION - PAIN QUALITY: BURNING
CONSTIPATION: 1
INDIGESTION: 1

## 2024-01-19 NOTE — HOSPICE
Ms. Imelda Coombs is a 78 year Romanian speaking lady from [facility] who  was admitted to [facility] with a hospice diagnosis of B cell lymphoma.      PPS 30.      Client is a DNR .     VS:  Temp 97.7   Apical 72 regular      Resp 12 at rest    B/P rt sitting 122/60    Imelda is alert and oriented. Client has pain in lt lateral breast area due to hx of shingles and left abdomen 5-10 burning constant. Patient has been taking Norco 5 mg twice daily - using a third dose if in extreme pain - reporting this has been sufficient.  Norco  5mg  increased from q 8 to q 4 hrs as needed as patient does rate baseline pain as 5/10 - ongoing teaching required re pain management.  Patient started gabapentin 100 mg qhs for shingles pain. Patient reports  occasional nausea but regular reflex-omeprazole administered prn by family. Teaching done on purpose of omeprazole to protect stomach as patient is also on dexamethasone  - decreased to q 8 hr at this time.   Family instructed to administer on schedule. Small bm today and c/o sluggish bowels. -Senna ordered to be given on schedule twice a day. Patient has been using multiple bowel meds prn with poor to fair effect.  Teaching done re use of senna on twice a day basis and other meds prn if senna not effective.  Ongoing teaching re bowel management required.  Patient is inc of urine - at times reporting some difficulty with voiding  - feeling need to void but unable.  Teaching done re bladder status - questioned whether patient might need indwelling catheter.  Patient declined suggestion - but may be open to catheter in future if problem persists.    Client is bedbound and has weakness in lower extremities and bilateral foot drop.   Open area to coccyx 3 x 0.3 x 0.2 stage 2 pink wound bed edges attached, latha wound normal.  Instructed to keep skin clean and dry and to use barrier cream daily and with each inc care.      Equipment from [facility]:   hospital bed with low air mattress

## 2024-01-20 ENCOUNTER — HOME CARE VISIT (OUTPATIENT)
Dept: HOSPICE | Facility: HOSPICE | Age: 79
End: 2024-01-20
Payer: MEDICARE

## 2024-01-20 VITALS
TEMPERATURE: 98.2 F | SYSTOLIC BLOOD PRESSURE: 120 MMHG | RESPIRATION RATE: 16 BRPM | DIASTOLIC BLOOD PRESSURE: 51 MMHG | HEART RATE: 91 BPM

## 2024-01-20 PROCEDURE — 0651 HSPC ROUTINE HOME CARE

## 2024-01-20 PROCEDURE — G0299 HHS/HOSPICE OF RN EA 15 MIN: HCPCS

## 2024-01-20 ASSESSMENT — ENCOUNTER SYMPTOMS: HEMOPTYSIS: 0

## 2024-01-20 NOTE — HOSPICE
SN greeted at door by daughter Vida. Patient lying in bed on arrival - daughter states patient is bedbound. Patient very pleasant, in good mood. Complains of her left knee feeling \"tired\" and discomfort left flank since having shingles x 6 years. Gabapentin prescribed for that - medications to be delivered today. Patient did take a norco around 1100 this morning per daughter. Patient is eating and drinking. Last BM 1/19/2024. Daughter states she has one at least every other day, although at times it is small. Pressure wound noted to left buttock - present on admission. Admission nurse left calazime cream - applied today by this RN. Diaper clean, not wet. Daughter declined help on changing patient, but I did help change patient position and educated on the importance of keeping heels elevated and bed in low position. BLE 2+ non pitting edema. BS active in all 4 quadrants. DME to be delivered today after 3 pm per daughter. No needs identified at this visit. Instructed daughter to call [facility] if patient has any falls, or with any questions or concerns 24/7. She verbalized understanding.

## 2024-01-21 PROCEDURE — 0651 HSPC ROUTINE HOME CARE

## 2024-01-22 ENCOUNTER — HOME CARE VISIT (OUTPATIENT)
Dept: SCHEDULING | Facility: HOME HEALTH | Age: 79
End: 2024-01-22
Payer: MEDICARE

## 2024-01-22 ENCOUNTER — HOME CARE VISIT (OUTPATIENT)
Dept: HOSPICE | Facility: HOSPICE | Age: 79
End: 2024-01-22
Payer: MEDICARE

## 2024-01-22 PROCEDURE — G0155 HHCP-SVS OF CSW,EA 15 MIN: HCPCS

## 2024-01-22 PROCEDURE — 2500000001 HSPC NON INJECTABLE MED

## 2024-01-22 PROCEDURE — G0156 HHCP-SVS OF AIDE,EA 15 MIN: HCPCS

## 2024-01-22 PROCEDURE — 0651 HSPC ROUTINE HOME CARE

## 2024-01-22 NOTE — HOSPICE
Initial MSW Assessment with  present for Spiritual Assessment.  We first met with Pt's dtr, Vida Edgar.  Vida is bilingual, but benefitted by having OA  present to confirm understanding and also explain information in Icelandic as needed.  One of Vida's concerns was wanting to know what Pt's last scans revealed.  She said no one told her and she just desires to know.  /MSW educated that Dr. Anderson will be making a home visit and he may be able to address.  Vida appears to be coping adequately with decline.  She is hopeful Pt will be able to attend Pt's [facility]'s wedding likely to be held in April.  MSW offered assistance with plans as the wedding gets closer.   home choice was discussed.  Vida shared Pt will be cremated with cremains interred in [facility].  She is requesting education re: process and cost.  MSW to f/u.  Tentatively plans to use [facility] at death. Pt may need financial assistance with cremation.  MSW offered education re: [facility] (respite), [facility] respite hours, and [facility] respite option.  Vida shared her mother prefers family members providing care for now.  Vida shared Pt said she wants to die in the hospice house, not at home.  MSW and  offered education re: factors impacting possibility of dying in the [facility].  She shared part of the decision was Pt wanted professional caregivers at her death to ensure Pt did not suffer.  Education offered re: role of hospice team in preparing/educating PCG in care and providing oversight for Pt's comfort. Pt born and raised in [place].  She moved to the  in 2018, after the death of her , Robert.  Pt has 2 dtrs.  She lives with Vida.  Her dtr, Liliana, lives across the street.  The dtrs provide 24-7 care, and both cont to work.  Pt has a large family in [facility].  PT has 4 grandchildren.  She was a homemaker.  She has a bird, Laura, in the home.  Pt is very committed to

## 2024-01-23 ENCOUNTER — HOME CARE VISIT (OUTPATIENT)
Dept: SCHEDULING | Facility: HOME HEALTH | Age: 79
End: 2024-01-23
Payer: MEDICARE

## 2024-01-23 VITALS
TEMPERATURE: 97.2 F | DIASTOLIC BLOOD PRESSURE: 68 MMHG | HEART RATE: 86 BPM | RESPIRATION RATE: 16 BRPM | SYSTOLIC BLOOD PRESSURE: 138 MMHG

## 2024-01-23 PROCEDURE — 0651 HSPC ROUTINE HOME CARE

## 2024-01-23 PROCEDURE — G0299 HHS/HOSPICE OF RN EA 15 MIN: HCPCS

## 2024-01-23 ASSESSMENT — ENCOUNTER SYMPTOMS: BOWEL INCONTINENCE: 1

## 2024-01-23 NOTE — HOSPICE
Routine home visit conducted today. Present for visit were pt, pt's daughter/PCG, Vida, and RN. RN greeted at door by Vida. Pt resting in hospital bed upon arrival. Alert and oriented x 4. Pleasant and telling jokes. Denies pain/SOB during visit. Reports taking Norco 1-2 times daily for chronic pain r/t herpetic neuralgia s/p shingles. Pt reports pain is mostly isolated to her left leg/hip area. Comfortable on RA. Pt reports great appetite.   Full assessment performed, see ROS.   Education provided, see POC.  No DME needs expressed.  RN to confirm with Jerod that script for comfort kit has been received.  Several supplies ordered via [employer].  Pt and daughter reminded of 24/7 RN availability and encouraged to call [facility] at any time with questions or concerns.

## 2024-01-23 NOTE — HOSPICE
S: Mrs. Imelda Ndiaye was sitting on her sofa in the living room at the time of the visit. The patient's  was present during the visit.  B: Initial visit/assessment   A: Imelda Ndiaye is a 78 y.o., female admitted to [facility], Client has pain in lt lateral breast area due to hx of shingles and left abdomen 5-10 burning constant. Patient has been taking Norco 5 mg twice daily - using a third dose if in extreme pain - reporting this has been sufficient.  Norco  5mg  increased from q 8 to q 4 hrs as needed as patient does rate baseline pain as 5/10 - ongoing teaching required re pain management. The patient's daughter, Vida, welcomed the  and . The patient appeared to be comfortable and with no pain, she said that her legs feel uncomfortable most of the time. Vida said that she helps her mom half of the day and her sister helps with the other half of the day. They are from [place], and the [place] the [place] her dad passed away; she said that her mom would like to be cremated and probably they will send the ashes to [facility]/ They are from a Hinduism konstantin background, Gnosticist; she is a member a [employer]. They requested a  for confession and last rites. The  contacted Fr. Jackson about the pt's request and Fr. Jackson said that he will be visiting them. The patient voiced her thankfulness for the visit.  provided spiritual care and emotional support through pastoral presence, meaningful conversation, active listening, and supportive responses.  R: assurance of care/availability; active listening; prayer.  Visit: 1 x / mo.; 2 prn

## 2024-01-24 PROCEDURE — 0651 HSPC ROUTINE HOME CARE

## 2024-01-25 PROBLEM — F32.A ANXIETY AND DEPRESSION: Status: ACTIVE | Noted: 2023-03-01

## 2024-01-25 PROBLEM — B02.29 POST HERPETIC NEURALGIA: Status: ACTIVE | Noted: 2023-03-01

## 2024-01-25 PROBLEM — Z51.5 HOSPICE CARE: Status: ACTIVE | Noted: 2024-01-25

## 2024-01-25 PROBLEM — F41.9 ANXIETY AND DEPRESSION: Status: ACTIVE | Noted: 2023-03-01

## 2024-01-25 PROBLEM — C50.911 MALIGNANT NEOPLASM OF RIGHT BREAST (HCC): Status: ACTIVE | Noted: 2023-04-13

## 2024-01-25 PROBLEM — R63.0 ANOREXIA: Status: ACTIVE | Noted: 2023-03-01

## 2024-01-25 PROCEDURE — 0651 HSPC ROUTINE HOME CARE

## 2024-01-26 ENCOUNTER — HOME CARE VISIT (OUTPATIENT)
Dept: SCHEDULING | Facility: HOME HEALTH | Age: 79
End: 2024-01-26
Payer: MEDICARE

## 2024-01-26 PROCEDURE — 0651 HSPC ROUTINE HOME CARE

## 2024-01-26 PROCEDURE — G0156 HHCP-SVS OF AIDE,EA 15 MIN: HCPCS

## 2024-01-27 PROCEDURE — 0651 HSPC ROUTINE HOME CARE

## 2024-01-28 PROCEDURE — 0651 HSPC ROUTINE HOME CARE

## 2024-01-29 ENCOUNTER — HOME CARE VISIT (OUTPATIENT)
Dept: SCHEDULING | Facility: HOME HEALTH | Age: 79
End: 2024-01-29
Payer: MEDICARE

## 2024-01-29 PROCEDURE — 0651 HSPC ROUTINE HOME CARE

## 2024-01-29 PROCEDURE — G0156 HHCP-SVS OF AIDE,EA 15 MIN: HCPCS

## 2024-01-30 ENCOUNTER — HOME CARE VISIT (OUTPATIENT)
Dept: SCHEDULING | Facility: HOME HEALTH | Age: 79
End: 2024-01-30
Payer: MEDICARE

## 2024-01-30 VITALS
DIASTOLIC BLOOD PRESSURE: 62 MMHG | HEART RATE: 87 BPM | RESPIRATION RATE: 16 BRPM | TEMPERATURE: 97.4 F | SYSTOLIC BLOOD PRESSURE: 128 MMHG

## 2024-01-30 PROCEDURE — 0651 HSPC ROUTINE HOME CARE

## 2024-01-30 PROCEDURE — 2500000001 HSPC NON INJECTABLE MED

## 2024-01-30 PROCEDURE — G0299 HHS/HOSPICE OF RN EA 15 MIN: HCPCS

## 2024-01-30 ASSESSMENT — ENCOUNTER SYMPTOMS: BOWEL INCONTINENCE: 1

## 2024-01-30 NOTE — HOSPICE
Routine home visit conducted today. Present for visit were pt, pt's daughter/PCG, Vida, Dr. Anderson, and RN. RN greeted at door by Vida. Pt resting in hospital bed upon arrival. Alert and oriented x 4. Pleasant and telling jokes. Pt reported pain 3/10 in left axilla area r/t post herpetic neuralgia. Pt reports she feels current dose of neurontin is not effective. Pt also reports taking Norco 1-2 times daily for chronic pain r/t herpetic neuralgia s/p shingles. Dr. Anderson increased neurontin dose, see MAR. Pt denies SOB during visit. Comfortable on RA. Pt reports great appetite. Bottom is severely excoriated and red due to moisture and exposure to frequent stools. Dr. Anderson suggested pt not take miralax unless no BM x 3 days and to apply zinc paste liberally. RN educated daughter on alternating pt from side to side to reduce pressure to sacral area. RN also suggested daughter allow pt to go some time with brief open and lay her on her side to improve air flow to her bottom area.    Full assessment performed, see ROS.   Education provided, see POC.   No DME/supply needs expressed.   Pt and daughter reminded of 24/7 RN availability and encouraged to call [facility] at any time with questions or concerns.

## 2024-01-31 ENCOUNTER — HOME CARE VISIT (OUTPATIENT)
Dept: HOSPICE | Facility: HOSPICE | Age: 79
End: 2024-01-31
Payer: MEDICARE

## 2024-01-31 ENCOUNTER — HOME CARE VISIT (OUTPATIENT)
Dept: SCHEDULING | Facility: HOME HEALTH | Age: 79
End: 2024-01-31
Payer: MEDICARE

## 2024-01-31 PROCEDURE — 2500000001 HSPC NON INJECTABLE MED

## 2024-01-31 PROCEDURE — 0651 HSPC ROUTINE HOME CARE

## 2024-01-31 PROCEDURE — G0155 HHCP-SVS OF CSW,EA 15 MIN: HCPCS

## 2024-01-31 NOTE — HOSPICE
MSW routine visit along with OA Tono Smith.   able to translate English to Azerbaijani (and vice versa) during visit.  Vida able to speak English, but better understands and communicates in Azerbaijani.  Imelda appeared in bright spirits, smiling throughout visit.  She reported she is feeling well, glad she can eat.  She shared she cannot move her left leg at all, but can slightly move her right leg.  MSW able to coordinate phone call with bilingual staff at [facility] to answer questions by Vida.  Vida shared with MSW and  that MD offered Pt may live to April, but is already showing signs of decline.  Vida appeared to be coping with the news; but hoping they could have more time with Pt.  Pt's grandtr being asked to move her wedding up so Pt will be able to see her get .  Vida had several areas of f/u for OA RN; who did call MSW back during visit and all taken care of by RN.  MSW and  plan to visit again together for Feb monthly visit.  MSW will cont to offer visits monthly and prn to provide ongoing emotional support and assessment of psychosocial and bereavement concerns and needs.

## 2024-02-01 ENCOUNTER — HOME CARE VISIT (OUTPATIENT)
Dept: SCHEDULING | Facility: HOME HEALTH | Age: 79
End: 2024-02-01
Payer: MEDICARE

## 2024-02-01 PROCEDURE — 0651 HSPC ROUTINE HOME CARE

## 2024-02-01 PROCEDURE — G0156 HHCP-SVS OF AIDE,EA 15 MIN: HCPCS

## 2024-02-01 NOTE — HOME HEALTH
S: Mrs. Imelda Ndiaye was lying down on her hospice bed in her room at the time of the visit. The patient's daughter was present at the time of the visit.  B: Routine visit/assessment   A: The patient's daughter welcomed the  and the  at the door. This was a join visit with the . The patient appeared to be comfortable and with no pain. The patient asked the  about [place], she was wondering which Abebe in the Bible was that referring to; the  searched and told the patient that it is Abebe Blayne. The patient said that that was very helpful to her, and she was wondering and worry about it. The patient was so thankful for all the help that the hospice people are providing for her. The patient's daughter called the  home with the . The  read [facility] [id number]. The patient was very thankful that Fr. Jackson came to visit her a couple of days ago.   provided spiritual care and emotional support through pastoral presence, meaningful conversation, active listening, supportive responses, and Bible reading (Psalm 62,63).  R: assurance of care/availability; active listening; prayer.  Visit: 1 x / mo.; 2 prn

## 2024-02-02 PROCEDURE — 0651 HSPC ROUTINE HOME CARE

## 2024-02-03 PROCEDURE — 0651 HSPC ROUTINE HOME CARE

## 2024-02-04 PROCEDURE — 0651 HSPC ROUTINE HOME CARE

## 2024-02-05 ENCOUNTER — HOME CARE VISIT (OUTPATIENT)
Dept: SCHEDULING | Facility: HOME HEALTH | Age: 79
End: 2024-02-05
Payer: MEDICARE

## 2024-02-07 ENCOUNTER — HOME CARE VISIT (OUTPATIENT)
Dept: SCHEDULING | Facility: HOME HEALTH | Age: 79
End: 2024-02-07
Payer: MEDICARE

## 2024-02-07 VITALS
HEART RATE: 97 BPM | RESPIRATION RATE: 16 BRPM | TEMPERATURE: 97.1 F | SYSTOLIC BLOOD PRESSURE: 130 MMHG | DIASTOLIC BLOOD PRESSURE: 76 MMHG

## 2024-02-07 PROCEDURE — G0299 HHS/HOSPICE OF RN EA 15 MIN: HCPCS

## 2024-02-07 ASSESSMENT — ENCOUNTER SYMPTOMS: BOWEL INCONTINENCE: 1

## 2024-02-07 NOTE — HOSPICE
Routine home visit conducted today. Present for visit were pt, pt's daughter/PCG, Vida, Tanisha pedraza, and RN. RN greeted at door by Vida. Pt resting in hospital bed upon arrival. Alert and oriented x 4. Pleasant and telling jokes per usual. Pt reports chronic pain 3/10 in left axilla area r/t post herpetic neuralgia. Gabapentin increased last week. Pt reports some relief from increase in dose but states she is still quite uncomfortable at times. Discussed with provider, Debby Philippe. Gabapentin increased to 300mg tid. Vida educated on medication change. Pt also reports taking Norco 1-2 times daily for chronic pain r/t immobility. Pt denies SOB during visit. Comfortable on RA. Pt reports great appetite. Bottom remains excoriated but has improved markedly since last assessment.    Full assessment performed, see ROS.   Education provided, see POC.   No DME/supply needs expressed.   Several medications refilled via [facility].   Pt and daughter reminded of 24/7 RN availability and encouraged to call [facility] at any time with questions or concerns.

## 2024-02-08 ENCOUNTER — HOME CARE VISIT (OUTPATIENT)
Dept: SCHEDULING | Facility: HOME HEALTH | Age: 79
End: 2024-02-08
Payer: MEDICARE

## 2024-02-08 PROCEDURE — G0156 HHCP-SVS OF AIDE,EA 15 MIN: HCPCS

## 2024-02-12 ENCOUNTER — HOME CARE VISIT (OUTPATIENT)
Dept: SCHEDULING | Facility: HOME HEALTH | Age: 79
End: 2024-02-12
Payer: MEDICARE

## 2024-02-12 PROCEDURE — G0156 HHCP-SVS OF AIDE,EA 15 MIN: HCPCS

## 2024-02-14 ENCOUNTER — HOME CARE VISIT (OUTPATIENT)
Dept: SCHEDULING | Facility: HOME HEALTH | Age: 79
End: 2024-02-14
Payer: MEDICARE

## 2024-02-14 VITALS
SYSTOLIC BLOOD PRESSURE: 120 MMHG | DIASTOLIC BLOOD PRESSURE: 62 MMHG | RESPIRATION RATE: 16 BRPM | TEMPERATURE: 96.9 F | HEART RATE: 88 BPM

## 2024-02-14 PROCEDURE — G0299 HHS/HOSPICE OF RN EA 15 MIN: HCPCS

## 2024-02-14 NOTE — HOSPICE
Routine home visit conducted today. Present for visit were pt, pt's daughter/PCG, michael Mcpherson Courtney, and RN. RN greeted at door by Vida. Pt resting in hospital bed upon arrival. Alert and oriented x 4. Pleasant and in good spirits. Pt reports chronic pain 5/10 in left axilla area r/t post herpetic neuralgia. Gabapentin increased last week again. Pt reports relief from increase in dose. Pt also reports taking Norco 1-2 times daily for chronic pain r/t immobility. Pt denies SOB during visit. Comfortable on RA. Pt reports great appetite. Bottom remains excoriated, see wound addendum.   Full assessment performed, see ROS.   Education provided, see POC.   No DME needs expressed.   Several medications refilled via [facility].   Pt and daughter reminded of 24/7 RN availability and encouraged to call [facility] at any time with questions or concerns.

## 2024-02-15 ENCOUNTER — HOME CARE VISIT (OUTPATIENT)
Dept: SCHEDULING | Facility: HOME HEALTH | Age: 79
End: 2024-02-15
Payer: MEDICARE

## 2024-02-15 PROCEDURE — G0156 HHCP-SVS OF AIDE,EA 15 MIN: HCPCS

## 2024-02-20 ENCOUNTER — HOME CARE VISIT (OUTPATIENT)
Dept: HOSPICE | Facility: HOSPICE | Age: 79
End: 2024-02-20
Payer: MEDICARE

## 2024-02-20 ENCOUNTER — HOME CARE VISIT (OUTPATIENT)
Dept: SCHEDULING | Facility: HOME HEALTH | Age: 79
End: 2024-02-20
Payer: MEDICARE

## 2024-02-20 PROCEDURE — G0155 HHCP-SVS OF CSW,EA 15 MIN: HCPCS

## 2024-02-20 NOTE — HOSPICE
MSW routine assessment coinciding with OA  who speaks Occitan fluently and interprets conversation.  Upon arrival, Imelda was in bed, eating breakfast, full of smiles.  She always presents with a bright affect; although reportedly she is a worrier and becomes anxious easily.  No s/s unmanaged emotional distress.  She shared she has not had a BM in 2 days, which is causing some discomfort to her stomach.  Vida shared she has been educated by RN re: interventions for bowel regimen.  Vida shared that Imelda has been saddened by her decreased vision, and inability to read the BIble which Imelda shared is her favorite book to read.  Tono educated Imelda that [facility] has videos of the BIble being read in Occitan.  Vida shared that the granddaughter's wedding is March 2nd and her sister is looking into possible transportation.  Discussed speaking with Dr. Anderson tomorrow re: his recommendation for Imelda to travel to the wedding in light of her current condition, sacral wound, ability to tolerate.  Vida and Imelda receptive to wait to hear of recommendation before planning for transportation.  (After visit, MSW called and spoke with OA RN to update).  Vida also mentioned she will need assistance with renewing her [facility] paperwork.  MSW offered to assist with paperwork as needed.  MSW will cont to offer visits monthly and prn to provide ongoing emotional support and assessment of psychosocial and bereavement concerns and needs.

## 2024-02-21 ENCOUNTER — HOME CARE VISIT (OUTPATIENT)
Dept: SCHEDULING | Facility: HOME HEALTH | Age: 79
End: 2024-02-21
Payer: MEDICARE

## 2024-02-21 PROCEDURE — G0299 HHS/HOSPICE OF RN EA 15 MIN: HCPCS

## 2024-02-21 NOTE — HOSPICE
S: Mrs. Imelda Ndiaye was lying down on her hospice bed in her room at the time of the visit. The patient's daughter was present at the time of the visit. This was a jointed visit with the .   B: Routine visit/assessment   A: The patient's daughter welcomed the  and the  at the door. The patient was lying in her room. The patient appeared to be comfortable and with no pain. Her daughter said that her mom is doing ok, her only issues is that she had not been able to go to the bathroom the last two days, in part she said is because the patient has a patch on her back, she said that she does have a medication to help with that and already gave her so she is expenting that the patient will go to the bacthroom anytime. The pt's daughter also shared about her niece wedding is coming up soon and their uncertainty if her mom should go or not to the wedding. The patient's daughter ask about an excuse from workm, the SW will take care of that. The  play [employer] [address] to listened with the patient and prayed with her and her daughter as well as the SW.   provided spiritual care and emotional support through pastoral presence, meaningful conversation, active listening, supportive responses, and Bible reading (Psalm 23), and prayer.  R: assurance of care/availability; active listening; prayer.  Visit: 1 x / mo.; 2 prn

## 2024-02-22 ENCOUNTER — HOME CARE VISIT (OUTPATIENT)
Dept: SCHEDULING | Facility: HOME HEALTH | Age: 79
End: 2024-02-22
Payer: MEDICARE

## 2024-02-22 VITALS
DIASTOLIC BLOOD PRESSURE: 60 MMHG | HEART RATE: 97 BPM | SYSTOLIC BLOOD PRESSURE: 112 MMHG | TEMPERATURE: 97.2 F | RESPIRATION RATE: 16 BRPM

## 2024-02-22 PROCEDURE — G0156 HHCP-SVS OF AIDE,EA 15 MIN: HCPCS

## 2024-02-22 ASSESSMENT — ENCOUNTER SYMPTOMS
BOWEL INCONTINENCE: 1
CONSTIPATION: 1

## 2024-02-22 NOTE — HOSPICE
Routine home visit conducted today. Present for visit were pt, pt's daughter/PCG, michael Mcpherson Courtney, and RN. RN greeted at door by Vida. Pt resting in hospital bed upon arrival. Alert and oriented x 4. Pleasant and in good spirits. Pt denied pain during assessment. Pt reports post herpetic pain has improved since most recent increase in gabapentin. Pt also reports taking Norco 1-2 times daily for chronic pain r/t immobility. Pt denies SOB during visit. Comfortable on RA. Pt reports great appetite. Bottom remains excoriated, see wound addendum. Ongoing education provided regarding position changing and skin care.   Full assessment performed, see ROS.   Education provided, see POC.   No DME/medication/supple needs expressed.   Pt and daughter reminded of 24/7 RN availability and encouraged to call [facility] at any time with questions or concerns.

## 2024-02-24 ENCOUNTER — HOME CARE VISIT (OUTPATIENT)
Dept: HOSPICE | Facility: HOSPICE | Age: 79
End: 2024-02-24
Payer: MEDICARE

## 2024-02-26 ENCOUNTER — HOME CARE VISIT (OUTPATIENT)
Dept: SCHEDULING | Facility: HOME HEALTH | Age: 79
End: 2024-02-26
Payer: MEDICARE

## 2024-02-26 PROCEDURE — G0156 HHCP-SVS OF AIDE,EA 15 MIN: HCPCS

## 2024-02-28 ENCOUNTER — HOME CARE VISIT (OUTPATIENT)
Dept: SCHEDULING | Facility: HOME HEALTH | Age: 79
End: 2024-02-28
Payer: MEDICARE

## 2024-02-28 VITALS
RESPIRATION RATE: 16 BRPM | TEMPERATURE: 97.4 F | HEART RATE: 93 BPM | SYSTOLIC BLOOD PRESSURE: 144 MMHG | DIASTOLIC BLOOD PRESSURE: 82 MMHG

## 2024-02-28 PROCEDURE — G0299 HHS/HOSPICE OF RN EA 15 MIN: HCPCS

## 2024-02-28 ASSESSMENT — ENCOUNTER SYMPTOMS: BOWEL INCONTINENCE: 1

## 2024-02-28 NOTE — HOSPICE
Routine home visit conducted today. Present for visit were pt, pt's daughter/PCG, Vida, and RN. RN greeted at door by Vida. Pt resting in hospital bed upon arrival. Alert and oriented x 4. Pleasant and in good spirits. Pt reported pain 4/10 in left arm/axilla area. Pt suffers from chronic post herpetic pain for which she reports current regimen is effective. Pt denies SOB during visit. Comfortable on RA. Pt reports great appetite. Bottom/gluteal fold remains red and excoriated, see wound addendum. Ongoing education provided regarding position changing and skin care. RN offered catheter. Pt declined at this time. RN discussed flushing port with pt and daughter. Both decline and stated they would rather not have port accessed.   Full assessment performed, see ROS.   Education provided, see POC.   Several supplies ordered via [employer].  Several meds refilled via [facility].   No DME needs expressed.   Pt and daughter reminded of 24/7 RN availability and encouraged to call [facility] at any time with questions or concerns.

## 2024-02-29 ENCOUNTER — HOME CARE VISIT (OUTPATIENT)
Dept: SCHEDULING | Facility: HOME HEALTH | Age: 79
End: 2024-02-29
Payer: MEDICARE

## 2024-02-29 PROCEDURE — G0156 HHCP-SVS OF AIDE,EA 15 MIN: HCPCS

## 2024-03-04 ENCOUNTER — HOME CARE VISIT (OUTPATIENT)
Dept: HOSPICE | Facility: HOSPICE | Age: 79
End: 2024-03-04
Payer: MEDICARE

## 2024-03-06 ENCOUNTER — HOME CARE VISIT (OUTPATIENT)
Dept: SCHEDULING | Facility: HOME HEALTH | Age: 79
End: 2024-03-06
Payer: MEDICARE

## 2024-03-06 VITALS
TEMPERATURE: 97.5 F | RESPIRATION RATE: 16 BRPM | DIASTOLIC BLOOD PRESSURE: 62 MMHG | HEART RATE: 85 BPM | SYSTOLIC BLOOD PRESSURE: 120 MMHG

## 2024-03-06 PROCEDURE — G0299 HHS/HOSPICE OF RN EA 15 MIN: HCPCS

## 2024-03-06 ASSESSMENT — ENCOUNTER SYMPTOMS: BOWEL INCONTINENCE: 1

## 2024-03-06 NOTE — HOSPICE
Routine home visit conducted today. Present for visit were pt, pt's daughter/PCG, Vida, and RN. RN greeted at door by Vida. Pt resting in hospital bed upon arrival. Alert and oriented x 4. Pleasant and in good spirits. Pt is becoming increasingly forgetful. Pt was unable to remember RN's name today. Pt also watched her granddaughter get  this weekend via [employer] and was unable to recall this. Sheilaira states mom's forgetfullness has increased considerably over the last 7 days. Pt reports chronic post herpetic pain in left arm 3/10 for which she reports current regimen is effective. Pt denies SOB during visit. Comfortable on RA. Pt reports great appetite. Bottom/gluteal fold remains red and excoriated, see wound addendum. Ongoing education provided regarding position changing and skin care.   Full assessment performed, see ROS.   Education provided, see POC.   Optifoams ordered via [employer].   No DME/medication needs expressed.   Pt and daughter reminded of 24/7 RN availability and encouraged to call [facility] at any time with questions or concerns.

## 2024-03-07 ENCOUNTER — HOME CARE VISIT (OUTPATIENT)
Dept: SCHEDULING | Facility: HOME HEALTH | Age: 79
End: 2024-03-07
Payer: MEDICARE

## 2024-03-07 PROCEDURE — G0156 HHCP-SVS OF AIDE,EA 15 MIN: HCPCS

## 2024-03-11 ENCOUNTER — HOME CARE VISIT (OUTPATIENT)
Dept: SCHEDULING | Facility: HOME HEALTH | Age: 79
End: 2024-03-11
Payer: MEDICARE

## 2024-03-11 ENCOUNTER — HOME CARE VISIT (OUTPATIENT)
Dept: HOSPICE | Facility: HOSPICE | Age: 79
End: 2024-03-11
Payer: MEDICARE

## 2024-03-11 PROCEDURE — G0155 HHCP-SVS OF CSW,EA 15 MIN: HCPCS

## 2024-03-11 NOTE — HOSPICE
Routine MSW visit along with  who is bi-lingual and serves as .  Imelda was in bright spirits as typical.  Her face was notably rounder than in prior visits.  Imelda's biggest concern today was the change in her vision.  She reported she sees a cloud in front of her.  She can no longer read, see TV very well.  Vida reports increased confusion.  She said Imelda will say strange things that aren't true and provide inaccurate information.  Today she said her nieces came to visit over the weekend; which is untrue.  MSW asked about her grandaughter's wedding.  Imelda was able to see the ceremony by facetime, and the bride/groom came over after for a few pictures.  (Dr. Anderson, medical director, recommended Imelda not try to attend the wedding).  Vida shared she is returning to work tomorrow, and asked if there are any resources for someone to stay with Imelda from 1:30 to 3:30 daily.  MSW offered supplemental care and respite stay at [facility] which would not cover the daily need, and suggested finding a family friend.  She plans to ask work tomorrow if they might be open to a change in her schedule to accommodate.   MSW will cont to offer visits monthly and prn to provide ongoing emotional support and assessment of psychosocial and bereavement concerns and needs.

## 2024-03-12 ENCOUNTER — HOME CARE VISIT (OUTPATIENT)
Dept: SCHEDULING | Facility: HOME HEALTH | Age: 79
End: 2024-03-12
Payer: MEDICARE

## 2024-03-13 ENCOUNTER — HOME CARE VISIT (OUTPATIENT)
Dept: SCHEDULING | Facility: HOME HEALTH | Age: 79
End: 2024-03-13
Payer: MEDICARE

## 2024-03-13 PROCEDURE — G0299 HHS/HOSPICE OF RN EA 15 MIN: HCPCS

## 2024-03-13 ASSESSMENT — ENCOUNTER SYMPTOMS: BOWEL INCONTINENCE: 1

## 2024-03-13 NOTE — HOSPICE
Routine home visit conducted today. Present for visit were pt, pt's daughter/PCG, Vida, and RN. RN greeted at door by Vida. Pt sound asleep in hospital bed upon arrival. Vida asked if pt could be left asleep and not disturbed. RN quietly assessed pt. VS not taken at Vida's request. RN sat with Vida on sofa in living room discussing pt's changes and EOL care, symptom managment, what to expect etc. Vida reports that pt is sleeping more hours each day. Pt was previously sleeping about 12 hours and is now sleeping about 14-16. Vida also reports that pt is becoming increasingly confused, hallucinating occasionally, very fortgetful, and at time agitated. Vida states pt gets very frustrated when she has a hard time finding the right word(s). Vida reports that pt's pain has been well managed. No nonverbal s/s of distress observed. Appetite remains good but Vida is often having to feed pt due to trembling in hands. Pt is also spilling her drinks and food. Wound not assessed this visit per daughter's request to not turn or awaken pt. Vida reports wound has improved since last week. RN educated Vida on contents of comfort kit, comfort feeding, and allowing pt to sleep as much as her body requires.  Full assessment performed, see ROS.   Education provided, see POC.   XL briefs ordered via [employer].   No DME/medication needs expressed.   Pt and daughter reminded of 24/7 RN availability and encouraged to call [facility] at any time with questions or concerns.

## 2024-03-13 NOTE — HOSPICE
S: Mrs. Imelda Ndiaye was lying down on her hospice bed in her room at the time of the visit. The patient's daughter was present at the time of the visit. This was a jointed visit with the .   B: Routine visit/assessment   A: The patient's daughter welcomed the  and the  at the door. The patient was lying in her room. The patient appeared to be comfortable and with no pain at the time of the visit. The patient's daughter said that her mom is declining, she talked to the Nurse about her concerns and the nurse explained her the reasons why her mom is experiencing changes. The patient's face is swollen, and her vision is getting worse according to the patient. The patient shared about the wedding of her granddaughter, and she was happy to be able to be there by the iPad. The  listened to the patient and her daughter and prayed with the pt and her daughter.  provided spiritual care and emotional support through pastoral presence, meaningful conversation, active listening, supportive responses, and Bible reading (Psalm 23), and prayer.  R: assurance of care/availability; active listening; prayer.  Visit: 1 x / mo.; 2 prn

## 2024-03-14 ENCOUNTER — HOME CARE VISIT (OUTPATIENT)
Dept: SCHEDULING | Facility: HOME HEALTH | Age: 79
End: 2024-03-14
Payer: MEDICARE

## 2024-03-14 PROCEDURE — G0156 HHCP-SVS OF AIDE,EA 15 MIN: HCPCS

## 2024-03-19 ENCOUNTER — HOME CARE VISIT (OUTPATIENT)
Dept: SCHEDULING | Facility: HOME HEALTH | Age: 79
End: 2024-03-19
Payer: MEDICARE

## 2024-03-19 ENCOUNTER — HOME CARE VISIT (OUTPATIENT)
Dept: HOSPICE | Facility: HOSPICE | Age: 79
End: 2024-03-19
Payer: MEDICARE

## 2024-03-20 ENCOUNTER — HOME CARE VISIT (OUTPATIENT)
Dept: SCHEDULING | Facility: HOME HEALTH | Age: 79
End: 2024-03-20
Payer: MEDICARE

## 2024-03-20 VITALS
DIASTOLIC BLOOD PRESSURE: 74 MMHG | HEART RATE: 104 BPM | RESPIRATION RATE: 14 BRPM | TEMPERATURE: 96.8 F | SYSTOLIC BLOOD PRESSURE: 128 MMHG

## 2024-03-20 PROCEDURE — G0299 HHS/HOSPICE OF RN EA 15 MIN: HCPCS

## 2024-03-20 ASSESSMENT — ENCOUNTER SYMPTOMS: BOWEL INCONTINENCE: 1

## 2024-03-20 NOTE — HOSPICE
Routine home visit conducted today. Present for visit were pt, pt's daughter/PCG, Vida, and RN. RN greeted at door by Vida. Pt is demonstrating an overall decline and change in condition. Pt is sleeping 18-20 hours each day. Pt was drowsy during visit and asked if she could go back to sleep. Pt was also tearful stating she is losing her sight. Pt was unable to see RN and recognize her face. Vida reports that pt is confused often during the hours she is awake. Pain is well managed with gabapentin and hydrocodone. Pt required one dose of morphine in the last 7 days for leg pain. Comfortable on RA. Appetite is decreasing. Pt continues to eat 3 meals daily but only about 50% of the food served. Pt is also needing to be fed due to a decline in fine motor skills and trembling. RN sat with Vida in the living room for a while to offer additional support. Vida expressed feeling very sad about her mom's condition. RN allowed Vida to express her grief. RN informed CH of pt's condition. CH to make visit and deliver blue book in Maldivian for family.   Full assessment performed, see ROS.   Education provided, see POC.   Several supplies ordered via [employer].   Several meds refilled via [facility]  No DME needs expressed.   Pt and daughter reminded of 24/7 RN availability and encouraged to call OA at any time with questions or concerns.

## 2024-03-21 ENCOUNTER — HOME CARE VISIT (OUTPATIENT)
Dept: HOSPICE | Facility: HOSPICE | Age: 79
End: 2024-03-21
Payer: MEDICARE

## 2024-03-26 ENCOUNTER — HOME CARE VISIT (OUTPATIENT)
Dept: SCHEDULING | Facility: HOME HEALTH | Age: 79
End: 2024-03-26
Payer: MEDICARE

## 2024-03-26 VITALS
TEMPERATURE: 97.1 F | HEART RATE: 103 BPM | RESPIRATION RATE: 14 BRPM | DIASTOLIC BLOOD PRESSURE: 80 MMHG | SYSTOLIC BLOOD PRESSURE: 138 MMHG

## 2024-03-26 PROCEDURE — G0299 HHS/HOSPICE OF RN EA 15 MIN: HCPCS

## 2024-03-26 PROCEDURE — G0156 HHCP-SVS OF AIDE,EA 15 MIN: HCPCS

## 2024-03-26 ASSESSMENT — ENCOUNTER SYMPTOMS: BOWEL INCONTINENCE: 1

## 2024-03-26 NOTE — HOSPICE
Pt has demonstrated a significant decline since last benefit period and will be recertified.   Upon admission pt was alert and oriented x 4, able to recognize RN and daughter, see clearly, sleeping about 10-12 hours each day, and did not demonstrate any confusion other than mild forgetfulness likely r/t age. Pt is now only oriented to self and place, often unable to recognize RN and occasionally unable to recognize daughter, sleeping on average 20 hours each day, and demonstrating confusion daily.   Pt has required an escalation in drug therapy to manage pain. Gabapentin has been increased several times and morphine initiated. Pt remains reluctant to take morphine when pain is unmanaged but has accepted 3 doses in the last 60 days.   Upon admission VS were stable. Pt is now demonstrating consistent tachycardia. Facial swelling is present and remains despite decrease in steroids dose. Facial swelling is new for pt and was not present on admission.   Pt is comfortable on RA and respiratory system WNL. This is unchanged from admission.   Pt's appetite has decreased since admission. Previously pt was eating 3 adult size meals daily consisting of a good balance of protein, carbs, and fats. A typical breakfast for pt was 2 eggs, toast, and avocado. Pt is now only eating toast and states she is only eating because she thinks she should and not because she is hungry.   Pt has developed a new wound/pressure injury since admission, Pt now has 2 stage II areas of breakdown to buttocks and sacral area. Pt is bedbound and this is unchanged from admission.   PPS remains unchanged at 30%.  RMAC remains unchanged at 25.5 cm.

## 2024-03-27 ENCOUNTER — HOME CARE VISIT (OUTPATIENT)
Dept: HOSPICE | Facility: HOSPICE | Age: 79
End: 2024-03-27
Payer: MEDICARE

## 2024-03-28 ENCOUNTER — HOME CARE VISIT (OUTPATIENT)
Dept: SCHEDULING | Facility: HOME HEALTH | Age: 79
End: 2024-03-28
Payer: MEDICARE

## 2024-04-03 NOTE — HOSPICE
Routine home visit conducted today. Present for visit were pt, pt's daughter/PCG, Vida, and RN. RN greeted at door by Vida. Pt asleep in hospital bed upon arrival. Easliy awakened and able to recognize RN. Vida reports that pt is sleeping about 22 hours each day and is often confused. Vida stated that pt is frequently refusing to be turned/changed and is demonstrating what may be terminal agitation. Pt is refusing morphine even though Vida reports she is hollering out in pain when care is provided. Discussed with provider, Debby Philippe. Fentanyl patch ordered, decadron discontiniued, and xanax increased to tid. Daughter reports pt is eating less than what she was previously with each meal. Pt is eating about one good meal each day. Daughter reports sometimes it is breakfast, sometimes dinner. Daughter stated for example pt used to eat 3 sausages for breakfast and now she is only eating 1. Wound to bottom area is worsening. Vida reports that pt is refusing to be turned from side to side and demands to lay flat.   Full assessment performed, see ROS.  Education provided, see POC.  No DME/supplly needs expressed.   Several meds refilled via [facility].  Vida reminded of 24/7 RN availability and encouraged to call [facility] at any time with questions or concerns.

## 2024-04-04 NOTE — HOSPICE
Assessment of death completed by Tanisha Meyer RN. Agency staff was not present at time of death. This RN assessed the patient who was pulseless and lung sounds were absent for greater than 4 minutes. Dr. Anderson notified, and time of death was 1525. Family at bedside and grieving appropriately. Family declined [facility] services. [facility] and [facility] notified of patient's death. Medications were disposed of per hospice policy. Family referred to bereavement services. [facility] will be [facility] family.

## 2024-04-04 NOTE — HOSPICE
Pt Imelda, daughter Vida, and several family members present during visit. RN greeted at door by daughter. Pt lying in hospital bed in bedroom on arrival. Pt appears pale, eyes closed, mouth open, irregular breathing with periods of apnea, not responding to tactile stimuli. Mottling noted to hands, knees, and feet. RN educated family on end of life and encouraged family to keep pt comfortable. Family verbalized understanding. Family educated on signs of discomfort or air hunger and when and how to give medications. Family verbalized understanding. Refills of roxanol and lorazepam called into [facility]. Reminded family of 24/7 RN availability; encouraged to call [facility] with any questions or concerns. Family verbalized understanding. Imminent care plan to be ordered.

## 2024-04-05 NOTE — HOSPICE
S: Mrs. Imelda Ndiaye was lying down on her hospice bed in her room at the time of the visit. Several people were present at the time of the visit.   B: Routine visit/assessment   A: The patient's daughter welcomed the  at the door. The patient was lying in her room at the time of the visit the patient was not able to communicate. The patient's daughter shared how much her mother was declining lately. Several of the people present expressed their love for the patient and were emotional. The  talked to the family and encouraged them to talk to the patient and expressed their love toward her. The  talked to the patient and read Psalm 23 to her and prayed for her and the family.  provided spiritual care and emotional support through pastoral presence, meaningful conversation, active listening, supportive responses, and Bible reading (Psalm 23), and prayer.  R: assurance of care/availability; active listening; prayer.  Visit: 1 x / mo.; 2 prn